# Patient Record
Sex: MALE | Race: WHITE | URBAN - METROPOLITAN AREA
[De-identification: names, ages, dates, MRNs, and addresses within clinical notes are randomized per-mention and may not be internally consistent; named-entity substitution may affect disease eponyms.]

---

## 2021-09-16 ENCOUNTER — WEB ENCOUNTER (OUTPATIENT)
Dept: URBAN - METROPOLITAN AREA CLINIC 105 | Facility: CLINIC | Age: 31
End: 2021-09-16

## 2021-09-16 ENCOUNTER — OFFICE VISIT (OUTPATIENT)
Dept: URBAN - METROPOLITAN AREA SURGERY CENTER 16 | Facility: SURGERY CENTER | Age: 31
End: 2021-09-16

## 2021-09-16 ENCOUNTER — OFFICE VISIT (OUTPATIENT)
Dept: URBAN - METROPOLITAN AREA CLINIC 105 | Facility: CLINIC | Age: 31
End: 2021-09-16
Payer: COMMERCIAL

## 2021-09-16 VITALS
DIASTOLIC BLOOD PRESSURE: 71 MMHG | TEMPERATURE: 97.3 F | HEIGHT: 73 IN | SYSTOLIC BLOOD PRESSURE: 102 MMHG | BODY MASS INDEX: 23.67 KG/M2 | HEART RATE: 134 BPM | WEIGHT: 178.6 LBS

## 2021-09-16 DIAGNOSIS — E73.9 LACTOSE INTOLERANCE: ICD-10-CM

## 2021-09-16 DIAGNOSIS — R19.7 DIARRHEA, UNSPECIFIED: ICD-10-CM

## 2021-09-16 DIAGNOSIS — R14.0 BLOATING: ICD-10-CM

## 2021-09-16 PROCEDURE — 99214 OFFICE O/P EST MOD 30 MIN: CPT | Performed by: INTERNAL MEDICINE

## 2021-09-16 RX ORDER — ADALIMUMAB 40MG/0.8ML
INJECT 0.8 MILLILITER (40 MG) BY SUBCUTANEOUS ROUTE EVERY 2 WEEKS IN THE ABDOMEN OR THIGH (ROTATE SITES) KIT SUBCUTANEOUS
Qty: 1 | Refills: 0 | Status: DISCONTINUED | COMMUNITY
Start: 1900-01-01

## 2021-09-16 RX ORDER — LORATADINE 10 MG/1
1 TABLET TABLET ORAL ONCE A DAY
Status: ACTIVE | COMMUNITY

## 2021-09-16 RX ORDER — EMTRICITABINE AND TENOFOVIR DISOPROXIL FUMARATE 200; 300 MG/1; MG/1
1 TABLET TABLET, FILM COATED ORAL ONCE A DAY
Status: ACTIVE | COMMUNITY

## 2021-09-16 RX ORDER — ESCITALOPRAM OXALATE 10 MG/1
1 TABLET TABLET, FILM COATED ORAL ONCE A DAY
Status: ACTIVE | COMMUNITY

## 2021-09-16 RX ORDER — FINASTERIDE 5 MG/1
1 TABLET TABLET, FILM COATED ORAL ONCE A DAY
Status: ACTIVE | COMMUNITY

## 2021-09-16 NOTE — HPI-TODAY'S VISIT:
The patient presents on follow-up for diarrhea.  On 3/5/20, the patient reported an onset of constant watery diarrhea 3 weeks ago. He had 4-5 BMs/day with rare nocturnal urgency. He denied traveling before onset and last took abx in October for a tonsillectomy. Stool sample and labs were done with his PCP. They were negative. He was prescribed Cipro and Flagyl he stated. He took it for a week with no benefit. He was then prescribed a 2-week course of Xifaxin TID and referred to me. He had one week left. In the last 3-4 days, he had noticed an improvement in formation and frequency (2-3x/day). He stated he had a sensitive stomach his whole life. Eating something too sweet or oily would cause bloating, cramping, and diarrhea to a lesser frequency. He would note diarrhea 1x/few months. He had a colonoscopy in 2010 for diarrhea. It was negative. He stated he was lactose intolerant. He described both indigestion and abdominal pain as bloating/cramping.  He took Descovy for HIV prevention. He used Humira for psoriasis. Previously, he took it every 2 weeks, but has decreased to 1x/month due to recurrent strep throat. Last use was in Jan 14. He took Viibryd for depression. It was well-controlled. He had a mild allergy to Aleve, he stated. He is a former tobacco user.   Today, he notes he did not follow through with the colonoscopy in March of 2020 because of COVID.  he saw Dr. Schumacher in follow-up who urged him to return for his colonoscopy given his intermittent diarrhea.  He has up to 6-7 stools/day intermittently.  He is currently off Humira.    Labs 3/11/20 - Stool study negative, Stool O&P negative x3. 2/19/20 - CBC, CMP all normal. 12/17/19 - CMO normal except sodium 147. GC/chlamydia negative.

## 2021-09-17 ENCOUNTER — OFFICE VISIT (OUTPATIENT)
Dept: URBAN - METROPOLITAN AREA SURGERY CENTER 16 | Facility: SURGERY CENTER | Age: 31
End: 2021-09-17
Payer: COMMERCIAL

## 2021-09-17 DIAGNOSIS — R19.7 ACUTE DIARRHEA: ICD-10-CM

## 2021-09-17 PROCEDURE — 45380 COLONOSCOPY AND BIOPSY: CPT | Performed by: INTERNAL MEDICINE

## 2021-09-17 PROCEDURE — G8907 PT DOC NO EVENTS ON DISCHARG: HCPCS | Performed by: INTERNAL MEDICINE

## 2021-09-17 RX ORDER — ESCITALOPRAM OXALATE 10 MG/1
1 TABLET TABLET, FILM COATED ORAL ONCE A DAY
Status: ACTIVE | COMMUNITY

## 2021-09-17 RX ORDER — FINASTERIDE 5 MG/1
1 TABLET TABLET, FILM COATED ORAL ONCE A DAY
Status: ACTIVE | COMMUNITY

## 2021-09-17 RX ORDER — LORATADINE 10 MG/1
1 TABLET TABLET ORAL ONCE A DAY
Status: ACTIVE | COMMUNITY

## 2021-09-17 RX ORDER — EMTRICITABINE AND TENOFOVIR DISOPROXIL FUMARATE 200; 300 MG/1; MG/1
1 TABLET TABLET, FILM COATED ORAL ONCE A DAY
Status: ACTIVE | COMMUNITY

## 2022-01-12 ENCOUNTER — OFFICE VISIT (OUTPATIENT)
Dept: URBAN - METROPOLITAN AREA CLINIC 105 | Facility: CLINIC | Age: 32
End: 2022-01-12
Payer: COMMERCIAL

## 2022-01-12 ENCOUNTER — TELEPHONE ENCOUNTER (OUTPATIENT)
Dept: URBAN - METROPOLITAN AREA CLINIC 105 | Facility: CLINIC | Age: 32
End: 2022-01-12

## 2022-01-12 VITALS
WEIGHT: 186.4 LBS | BODY MASS INDEX: 24.7 KG/M2 | HEIGHT: 73 IN | HEART RATE: 76 BPM | SYSTOLIC BLOOD PRESSURE: 125 MMHG | TEMPERATURE: 96.8 F | DIASTOLIC BLOOD PRESSURE: 80 MMHG

## 2022-01-12 DIAGNOSIS — A04.72 C. DIFFICILE DIARRHEA: ICD-10-CM

## 2022-01-12 PROCEDURE — 99213 OFFICE O/P EST LOW 20 MIN: CPT | Performed by: INTERNAL MEDICINE

## 2022-01-12 RX ORDER — ESCITALOPRAM OXALATE 10 MG/1
1 TABLET TABLET, FILM COATED ORAL ONCE A DAY
COMMUNITY

## 2022-01-12 RX ORDER — EMTRICITABINE AND TENOFOVIR DISOPROXIL FUMARATE 200; 300 MG/1; MG/1
1 TABLET TABLET, FILM COATED ORAL ONCE A DAY
COMMUNITY

## 2022-01-12 RX ORDER — LORATADINE 10 MG/1
1 TABLET TABLET ORAL ONCE A DAY
COMMUNITY

## 2022-01-12 RX ORDER — FINASTERIDE 5 MG/1
1 TABLET TABLET, FILM COATED ORAL ONCE A DAY
COMMUNITY

## 2022-01-12 NOTE — HPI-TODAY'S VISIT:
The patient was referred by Dr. Stanley for eval for c.diff .   A copy of this document is being forwarded to the referring provider.  - c.diff recurrent - finished vancomycin in the past, then c.diff recurred, finshed fidaxomycin another dose;  afterwards he had normal bowel movements, which for this patient is 3 bowel movements a day - he scheduled appoinntmant today because he had diarrhea for a day; but now back to normal, which is 3 bowel movements; soft; no watery stools - when he had c.diff in the past, it was all watery diarrhea

## 2022-01-20 ENCOUNTER — TELEPHONE ENCOUNTER (OUTPATIENT)
Dept: URBAN - METROPOLITAN AREA CLINIC 105 | Facility: CLINIC | Age: 32
End: 2022-01-20

## 2022-01-20 RX ORDER — LORATADINE 10 MG/1
1 TABLET TABLET ORAL ONCE A DAY
COMMUNITY

## 2022-01-20 RX ORDER — FINASTERIDE 5 MG/1
1 TABLET TABLET, FILM COATED ORAL ONCE A DAY
COMMUNITY

## 2022-01-20 RX ORDER — ESCITALOPRAM OXALATE 10 MG/1
1 TABLET TABLET, FILM COATED ORAL ONCE A DAY
COMMUNITY

## 2022-01-20 RX ORDER — EMTRICITABINE AND TENOFOVIR DISOPROXIL FUMARATE 200; 300 MG/1; MG/1
1 TABLET TABLET, FILM COATED ORAL ONCE A DAY
COMMUNITY

## 2022-01-25 ENCOUNTER — OFFICE VISIT (OUTPATIENT)
Dept: URBAN - METROPOLITAN AREA CLINIC 105 | Facility: CLINIC | Age: 32
End: 2022-01-25
Payer: COMMERCIAL

## 2022-01-25 VITALS
HEART RATE: 73 BPM | DIASTOLIC BLOOD PRESSURE: 80 MMHG | HEIGHT: 73 IN | TEMPERATURE: 97 F | SYSTOLIC BLOOD PRESSURE: 148 MMHG | BODY MASS INDEX: 24.52 KG/M2 | WEIGHT: 185 LBS

## 2022-01-25 DIAGNOSIS — E73.9 LACTOSE INTOLERANCE: ICD-10-CM

## 2022-01-25 DIAGNOSIS — A04.72 C. DIFFICILE DIARRHEA: ICD-10-CM

## 2022-01-25 DIAGNOSIS — R14.0 BLOATING: ICD-10-CM

## 2022-01-25 PROCEDURE — 99214 OFFICE O/P EST MOD 30 MIN: CPT | Performed by: INTERNAL MEDICINE

## 2022-01-25 RX ORDER — ESCITALOPRAM OXALATE 10 MG/1
1 TABLET TABLET, FILM COATED ORAL ONCE A DAY
COMMUNITY

## 2022-01-25 RX ORDER — EMTRICITABINE AND TENOFOVIR DISOPROXIL FUMARATE 200; 300 MG/1; MG/1
1 TABLET TABLET, FILM COATED ORAL ONCE A DAY
COMMUNITY

## 2022-01-25 RX ORDER — FINASTERIDE 5 MG/1
1 TABLET TABLET, FILM COATED ORAL ONCE A DAY
COMMUNITY

## 2022-01-25 RX ORDER — VANCOMYCIN HYDROCHLORIDE 125 MG/1
125 MG CAPSULE ORAL QID
Qty: 56 | Refills: 2 | OUTPATIENT
Start: 2022-01-25 | End: 2022-03-08

## 2022-01-25 RX ORDER — LORATADINE 10 MG/1
1 TABLET TABLET ORAL ONCE A DAY
COMMUNITY

## 2022-01-25 NOTE — HPI-TODAY'S VISIT:
The patient presents on follow-up for diarrhea.  On 3/5/20, the patient reported an onset of constant watery diarrhea 3 weeks ago. He had 4-5 BMs/day with rare nocturnal urgency. He denied traveling before onset and last took abx in October for a tonsillectomy. Stool sample and labs were done with his PCP. They were negative. He was prescribed Cipro and Flagyl he stated. He took it for a week with no benefit. He was then prescribed a 2-week course of Xifaxin TID and referred to me. He had one week left. In the last 3-4 days, he had noticed an improvement in formation and frequency (2-3x/day). He stated he had a sensitive stomach his whole life. Eating something too sweet or oily would cause bloating, cramping, and diarrhea to a lesser frequency. He would note diarrhea 1x/few months. He had a colonoscopy in 2010 for diarrhea. It was negative. He stated he was lactose intolerant. He described both indigestion and abdominal pain as bloating/cramping.  He took Descovy for HIV prevention. He used Humira for psoriasis. Previously, he took it every 2 weeks, but has decreased to 1x/month due to recurrent strep throat. Last use was in Jan 14. He took Viibryd for depression. It was well-controlled. He had a mild allergy to Aleve, he stated. He is a former tobacco user.   On 9/16/21, he noted he did not follow through with the colonoscopy in March of 2020 because of COVID. He saw Dr. Schumacher in follow-up who urged him to return for his colonoscopy given his intermittent diarrhea. He had up to 6-7 stools/day intermittently.  He was currently off Humira.  Today, presents in follow-up.  He saw Dr. Sargent on 1/12/22 noting recurrent C diff tx'ed with vancomycin initially then fidaxomycin.  He had a recurrence of watery diarrhea and has started on a second course of fidaxomycin that Dr. Sargent prescribed just prior to this appt.   He notes his diarrhea has resolved - almost immediately after starting fidaxomycin. He only notes some bloating and abdominal discomfort for the past 2 days, but no diarrhea - 3 BMs QD. He is drinking a probiotic daily.   Labs 3/11/20 - Stool study negative, Stool O&P negative x3. 2/19/20 - CBC, CMP all normal. 12/17/19 - CMO normal except sodium 147. GC/chlamydia negative.

## 2022-02-04 ENCOUNTER — OFFICE VISIT (OUTPATIENT)
Dept: URBAN - METROPOLITAN AREA CLINIC 105 | Facility: CLINIC | Age: 32
End: 2022-02-04

## 2022-02-04 RX ORDER — VANCOMYCIN HYDROCHLORIDE 125 MG/1
125 MG CAPSULE ORAL QID
Qty: 56 | Refills: 2 | COMMUNITY
Start: 2022-01-25 | End: 2022-03-08

## 2022-02-04 RX ORDER — ESCITALOPRAM OXALATE 10 MG/1
1 TABLET TABLET, FILM COATED ORAL ONCE A DAY
COMMUNITY

## 2022-02-04 RX ORDER — EMTRICITABINE AND TENOFOVIR DISOPROXIL FUMARATE 200; 300 MG/1; MG/1
1 TABLET TABLET, FILM COATED ORAL ONCE A DAY
COMMUNITY

## 2022-02-04 RX ORDER — LORATADINE 10 MG/1
1 TABLET TABLET ORAL ONCE A DAY
COMMUNITY

## 2022-02-04 RX ORDER — FINASTERIDE 5 MG/1
1 TABLET TABLET, FILM COATED ORAL ONCE A DAY
COMMUNITY

## 2022-03-02 ENCOUNTER — TELEPHONE ENCOUNTER (OUTPATIENT)
Dept: URBAN - METROPOLITAN AREA CLINIC 105 | Facility: CLINIC | Age: 32
End: 2022-03-02

## 2022-03-08 ENCOUNTER — LAB OUTSIDE AN ENCOUNTER (OUTPATIENT)
Dept: URBAN - METROPOLITAN AREA CLINIC 105 | Facility: CLINIC | Age: 32
End: 2022-03-08

## 2022-03-08 ENCOUNTER — OFFICE VISIT (OUTPATIENT)
Dept: URBAN - METROPOLITAN AREA CLINIC 105 | Facility: CLINIC | Age: 32
End: 2022-03-08
Payer: COMMERCIAL

## 2022-03-08 ENCOUNTER — WEB ENCOUNTER (OUTPATIENT)
Dept: URBAN - METROPOLITAN AREA CLINIC 105 | Facility: CLINIC | Age: 32
End: 2022-03-08

## 2022-03-08 VITALS
HEART RATE: 81 BPM | BODY MASS INDEX: 23.51 KG/M2 | WEIGHT: 177.4 LBS | TEMPERATURE: 97.1 F | DIASTOLIC BLOOD PRESSURE: 76 MMHG | SYSTOLIC BLOOD PRESSURE: 126 MMHG | HEIGHT: 73 IN

## 2022-03-08 DIAGNOSIS — R11.0 NAUSEA: ICD-10-CM

## 2022-03-08 DIAGNOSIS — R14.0 BLOATING: ICD-10-CM

## 2022-03-08 DIAGNOSIS — A04.72 C. DIFFICILE DIARRHEA: ICD-10-CM

## 2022-03-08 DIAGNOSIS — E73.9 LACTOSE INTOLERANCE: ICD-10-CM

## 2022-03-08 PROCEDURE — 99214 OFFICE O/P EST MOD 30 MIN: CPT | Performed by: INTERNAL MEDICINE

## 2022-03-08 RX ORDER — LORATADINE 10 MG/1
1 TABLET TABLET ORAL ONCE A DAY
Status: ON HOLD | COMMUNITY

## 2022-03-08 RX ORDER — EMTRICITABINE AND TENOFOVIR DISOPROXIL FUMARATE 200; 300 MG/1; MG/1
1 TABLET TABLET, FILM COATED ORAL ONCE A DAY
Status: ACTIVE | COMMUNITY

## 2022-03-08 RX ORDER — ONDANSETRON 4 MG/1
1 TABLET ON THE TONGUE AND ALLOW TO DISSOLVE TABLET, ORALLY DISINTEGRATING ORAL
Qty: 30 | Refills: 2 | OUTPATIENT
Start: 2022-03-08

## 2022-03-08 RX ORDER — ESCITALOPRAM OXALATE 10 MG/1
1 TABLET TABLET, FILM COATED ORAL ONCE A DAY
Status: ACTIVE | COMMUNITY

## 2022-03-08 RX ORDER — FINASTERIDE 5 MG/1
1 TABLET TABLET, FILM COATED ORAL ONCE A DAY
Status: ACTIVE | COMMUNITY

## 2022-03-08 RX ORDER — VANCOMYCIN HYDROCHLORIDE 125 MG/1
125 MG CAPSULE ORAL QID
Qty: 56 | Refills: 2 | Status: ACTIVE | COMMUNITY
Start: 2022-01-25 | End: 2022-03-08

## 2022-03-08 NOTE — HPI-TODAY'S VISIT:
PAST MEDICAL HISTORY:  The patient presents on follow-up for diarrhea.  On 3/5/20, the patient reported an onset of constant watery diarrhea 3 weeks ago. He had 4-5 BMs/day with rare nocturnal urgency. He denied traveling before onset and last took abx in October for a tonsillectomy. Stool sample and labs were done with his PCP. They were negative. He was prescribed Cipro and Flagyl he stated. He took it for a week with no benefit. He was then prescribed a 2-week course of Xifaxin TID and referred to me. He had one week left. In the last 3-4 days, he had noticed an improvement in formation and frequency (2-3x/day). He stated he had a sensitive stomach his whole life. Eating something too sweet or oily would cause bloating, cramping, and diarrhea to a lesser frequency. He would note diarrhea 1x/few months. He had a colonoscopy in 2010 for diarrhea. It was negative. He stated he was lactose intolerant. He described both indigestion and abdominal pain as bloating/cramping.  He took Descovy for HIV prevention. He used Humira for psoriasis. Previously, he took it every 2 weeks, but has decreased to 1x/month due to recurrent strep throat. Last use was in Jan 14. He took Viibryd for depression. It was well-controlled. He had a mild allergy to Aleve, he stated. He is a former tobacco user.   On 9/16/21, he noted he did not follow through with the colonoscopy in March of 2020 because of COVID. He saw Dr. Schumacher in follow-up who urged him to return for his colonoscopy given his intermittent diarrhea. He had up to 6-7 stools/day intermittently.  He was currently off Humira.  On 1/25/22, he presented in follow-up.  He saw Dr. Sargent on 1/12/22 noting recurrent C diff tx'ed with vancomycin initially then fidaxomycin.  He had a recurrence of watery diarrhea and has started on a second course of fidaxomycin that Dr. Sargent prescribed just prior to this appt.   He noted his diarrhea had resolved - almost immediately after starting fidaxomycin. He only noted some bloating and abdominal discomfort for the past 2 days, but no diarrhea - 3 BMs QD. He was drinking a probiotic daily.  HPI:  Today, he says he is off Dificid - diarrhea resolved within 1-2 days of starting it. While off Dificid in early Feb, he did not have diarrhea either, but started to note a looser stool 2-2.5 weeks ago. Twelve days ago, he had 10-15 BMs in a day, so he started on the vancomycin prescription. His frequency improved - was having diarrhea 1-2x/day. He started having nausea, fatigue, and abdominal cramping last week. He continues on vancomycin 125 mg QID (started 12 days ago). Yesterday, he had 5 soft BMs QD, today had a formed BM so far (his first formed BM in awhile). He last had watery BMs on Sunday. He denies nocturnal urgency. He is taking a probiotic daily.   Labs 3/11/20 - Stool study negative, Stool O&P negative x3. 2/19/20 - CBC, CMP all normal. 12/17/19 - CMP normal except sodium 147. GC/chlamydia negative.

## 2022-03-10 LAB — GASTROINTESTINAL PATHOGEN: (no result)

## 2022-03-11 ENCOUNTER — TELEPHONE ENCOUNTER (OUTPATIENT)
Dept: URBAN - METROPOLITAN AREA CLINIC 92 | Facility: CLINIC | Age: 32
End: 2022-03-11

## 2022-03-11 RX ORDER — METRONIDAZOLE 500 MG/1
1 TABLET TABLET ORAL THREE TIMES A DAY
Qty: 30 | Refills: 0 | OUTPATIENT
Start: 2022-03-11 | End: 2022-03-21

## 2022-03-11 RX ORDER — LORATADINE 10 MG/1
1 TABLET TABLET ORAL ONCE A DAY
Status: ON HOLD | COMMUNITY

## 2022-03-11 RX ORDER — FINASTERIDE 5 MG/1
1 TABLET TABLET, FILM COATED ORAL ONCE A DAY
COMMUNITY

## 2022-03-11 RX ORDER — EMTRICITABINE AND TENOFOVIR DISOPROXIL FUMARATE 200; 300 MG/1; MG/1
1 TABLET TABLET, FILM COATED ORAL ONCE A DAY
COMMUNITY

## 2022-03-11 RX ORDER — ESCITALOPRAM OXALATE 10 MG/1
1 TABLET TABLET, FILM COATED ORAL ONCE A DAY
COMMUNITY

## 2022-03-11 RX ORDER — ONDANSETRON 4 MG/1
1 TABLET ON THE TONGUE AND ALLOW TO DISSOLVE TABLET, ORALLY DISINTEGRATING ORAL
Qty: 30 | Refills: 2 | Status: ACTIVE | COMMUNITY
Start: 2022-03-08

## 2022-03-30 ENCOUNTER — OFFICE VISIT (OUTPATIENT)
Dept: URBAN - METROPOLITAN AREA CLINIC 105 | Facility: CLINIC | Age: 32
End: 2022-03-30
Payer: COMMERCIAL

## 2022-03-30 VITALS
HEIGHT: 73 IN | HEART RATE: 79 BPM | BODY MASS INDEX: 23.75 KG/M2 | TEMPERATURE: 97.4 F | WEIGHT: 179.2 LBS | SYSTOLIC BLOOD PRESSURE: 130 MMHG | DIASTOLIC BLOOD PRESSURE: 82 MMHG

## 2022-03-30 DIAGNOSIS — A07.1 GIARDIA: ICD-10-CM

## 2022-03-30 DIAGNOSIS — E73.8 OTHER LACTOSE INTOLERANCE: ICD-10-CM

## 2022-03-30 DIAGNOSIS — A04.72 C. DIFFICILE DIARRHEA: ICD-10-CM

## 2022-03-30 PROCEDURE — 99213 OFFICE O/P EST LOW 20 MIN: CPT | Performed by: INTERNAL MEDICINE

## 2022-03-30 RX ORDER — FINASTERIDE 5 MG/1
1 TABLET TABLET, FILM COATED ORAL ONCE A DAY
Status: ACTIVE | COMMUNITY

## 2022-03-30 RX ORDER — ONDANSETRON 4 MG/1
1 TABLET ON THE TONGUE AND ALLOW TO DISSOLVE TABLET, ORALLY DISINTEGRATING ORAL
Qty: 30 | Refills: 2 | Status: ACTIVE | COMMUNITY
Start: 2022-03-08

## 2022-03-30 RX ORDER — ESCITALOPRAM OXALATE 10 MG/1
1 TABLET TABLET, FILM COATED ORAL ONCE A DAY
Status: ACTIVE | COMMUNITY

## 2022-03-30 RX ORDER — EMTRICITABINE AND TENOFOVIR DISOPROXIL FUMARATE 200; 300 MG/1; MG/1
1 TABLET TABLET, FILM COATED ORAL ONCE A DAY
Status: ACTIVE | COMMUNITY

## 2022-03-30 NOTE — HPI-TODAY'S VISIT:
The patient presents on follow-up for diarrhea.  PAST MEDICAL HISTORY: On 3/5/20, the patient reported an onset of constant watery diarrhea 3 weeks ago. He had 4-5 BMs/day with rare nocturnal urgency. He denied traveling before onset and last took abx in October for a tonsillectomy. Stool sample and labs were done with his PCP. They were negative. He was prescribed Cipro and Flagyl he stated. He took it for a week with no benefit. He was then prescribed a 2-week course of Xifaxin TID and referred to me. He had one week left. In the last 3-4 days, he had noticed an improvement in formation and frequency (2-3x/day). He stated he had a sensitive stomach his whole life. Eating something too sweet or oily would cause bloating, cramping, and diarrhea to a lesser frequency. He would note diarrhea 1x/few months. He had a colonoscopy in 2010 for diarrhea. It was negative. He stated he was lactose intolerant. He described both indigestion and abdominal pain as bloating/cramping.  He took Descovy for HIV prevention. He used Humira for psoriasis. Previously, he took it every 2 weeks, but has decreased to 1x/month due to recurrent strep throat. Last use was in Jan 14. He took Viibryd for depression. It was well-controlled. He had a mild allergy to Aleve, he stated. He is a former tobacco user.   On 9/16/21, he noted he did not follow through with the colonoscopy in March of 2020 because of COVID. He saw Dr. Schumacher in follow-up who urged him to return for his colonoscopy given his intermittent diarrhea. He had up to 6-7 stools/day intermittently.  He was currently off Humira.  On 1/25/22, he presented in follow-up.  He saw Dr. Sargent on 1/12/22 noting recurrent C diff tx'ed with vancomycin initially then fidaxomycin.  He had a recurrence of watery diarrhea and has started on a second course of fidaxomycin that Dr. Sargent prescribed just prior to this appt.   He noted his diarrhea had resolved - almost immediately after starting fidaxomycin. He only noted some bloating and abdominal discomfort for the past 2 days, but no diarrhea - 3 BMs QD. He was drinking a probiotic daily.  On 3/8/22, he said he was off Dificid - diarrhea resolved within 1-2 days of starting it. While off Dificid in early Feb, he did not have diarrhea either, but started to note a looser stool 2-2.5 weeks ago. Twelve days ago, he had 10-15 BMs in a day, so he started on the vancomycin prescription. His frequency improved - was having diarrhea 1-2x/day. He started having nausea, fatigue, and abdominal cramping last week. He continued on vancomycin 125 mg QID (started 12 days ago). Yesterday, he had 5 soft BMs QD, today had a formed BM so far (his first formed BM in awhile). He last had watery BMs on Sunday. He denied nocturnal urgency. He was taking a probiotic daily.   ADDENDUM: Stool study positive for Giardia. Negative for C diff. Tx'ed with metronidazole x 10 days and instructed to discontinue vancomycin.  HPI: Today, he says he finished treatment for Giardia - diarrhea resolved. He now has 2-3 formed BMs QD without diarrhea. Bloating and nausea has resolved.    Labs 3/8/22 - Stool study positive for Giardia. 3/11/20 - Stool study negative, Stool O&P negative x3. 2/19/20 - CBC, CMP all normal. 12/17/19 - CMP normal except sodium 147. GC/chlamydia negative.

## 2022-10-25 ENCOUNTER — OFFICE VISIT (OUTPATIENT)
Dept: URBAN - METROPOLITAN AREA CLINIC 105 | Facility: CLINIC | Age: 32
End: 2022-10-25
Payer: COMMERCIAL

## 2022-10-25 ENCOUNTER — LAB OUTSIDE AN ENCOUNTER (OUTPATIENT)
Dept: URBAN - METROPOLITAN AREA CLINIC 105 | Facility: CLINIC | Age: 32
End: 2022-10-25

## 2022-10-25 VITALS
BODY MASS INDEX: 25.21 KG/M2 | SYSTOLIC BLOOD PRESSURE: 129 MMHG | HEIGHT: 73 IN | WEIGHT: 190.2 LBS | DIASTOLIC BLOOD PRESSURE: 81 MMHG | HEART RATE: 68 BPM | TEMPERATURE: 96.5 F

## 2022-10-25 DIAGNOSIS — A07.1 GIARDIA: ICD-10-CM

## 2022-10-25 DIAGNOSIS — R10.9 ABDOMINAL DISCOMFORT: ICD-10-CM

## 2022-10-25 DIAGNOSIS — A04.72 C. DIFFICILE DIARRHEA: ICD-10-CM

## 2022-10-25 DIAGNOSIS — R19.7 DIARRHEA, UNSPECIFIED TYPE: ICD-10-CM

## 2022-10-25 DIAGNOSIS — R14.0 BLOATING: ICD-10-CM

## 2022-10-25 DIAGNOSIS — E73.9 LACTOSE INTOLERANCE: ICD-10-CM

## 2022-10-25 PROCEDURE — 99214 OFFICE O/P EST MOD 30 MIN: CPT | Performed by: INTERNAL MEDICINE

## 2022-10-25 RX ORDER — EMTRICITABINE AND TENOFOVIR DISOPROXIL FUMARATE 200; 300 MG/1; MG/1
1 TABLET TABLET, FILM COATED ORAL ONCE A DAY
Status: ACTIVE | COMMUNITY

## 2022-10-25 RX ORDER — ONDANSETRON 4 MG/1
1 TABLET ON THE TONGUE AND ALLOW TO DISSOLVE TABLET, ORALLY DISINTEGRATING ORAL
Qty: 30 | Refills: 2 | Status: ON HOLD | COMMUNITY
Start: 2022-03-08

## 2022-10-25 RX ORDER — HYOSCYAMINE SULFATE 0.12 MG/1
1 TABLET TABLET SUBLINGUAL
Qty: 30 | Refills: 2 | OUTPATIENT
Start: 2022-10-25 | End: 2023-01-22

## 2022-10-25 RX ORDER — ESCITALOPRAM OXALATE 10 MG/1
1 TABLET TABLET, FILM COATED ORAL ONCE A DAY
Status: ACTIVE | COMMUNITY

## 2022-10-25 RX ORDER — FINASTERIDE 5 MG/1
1 TABLET TABLET, FILM COATED ORAL ONCE A DAY
Status: ACTIVE | COMMUNITY

## 2022-10-25 NOTE — HPI-TODAY'S VISIT:
The patient presents on follow-up for diarrhea.  PAST MEDICAL HISTORY: On 3/5/20, the patient reported an onset of constant watery diarrhea 3 weeks ago. He had 4-5 BMs/day with rare nocturnal urgency. He denied traveling before onset and last took abx in October for a tonsillectomy. Stool sample and labs were done with his PCP. They were negative. He was prescribed Cipro and Flagyl he stated. He took it for a week with no benefit. He was then prescribed a 2-week course of Xifaxin TID and referred to me. He had one week left. In the last 3-4 days, he had noticed an improvement in formation and frequency (2-3x/day). He stated he had a sensitive stomach his whole life. Eating something too sweet or oily would cause bloating, cramping, and diarrhea to a lesser frequency. He would note diarrhea 1x/few months. He had a colonoscopy in 2010 for diarrhea. It was negative. He stated he was lactose intolerant. He described both indigestion and abdominal pain as bloating/cramping.  He took Descovy for HIV prevention. He used Humira for psoriasis. Previously, he took it every 2 weeks, but has decreased to 1x/month due to recurrent strep throat. Last use was in Jan 14. He took Viibryd for depression. It was well-controlled. He had a mild allergy to Aleve, he stated. He is a former tobacco user.   On 9/16/21, he noted he did not follow through with the colonoscopy in March of 2020 because of COVID. He saw Dr. Schumacher in follow-up who urged him to return for his colonoscopy given his intermittent diarrhea. He had up to 6-7 stools/day intermittently.  He was currently off Humira.  On 1/25/22, he presented in follow-up.  He saw Dr. Sargent on 1/12/22 noting recurrent C diff tx'ed with vancomycin initially then fidaxomycin.  He had a recurrence of watery diarrhea and has started on a second course of fidaxomycin that Dr. Sargent prescribed just prior to this appt.   He noted his diarrhea had resolved - almost immediately after starting fidaxomycin. He only noted some bloating and abdominal discomfort for the past 2 days, but no diarrhea - 3 BMs QD. He was drinking a probiotic daily.  On 3/8/22, he said he was off Dificid - diarrhea resolved within 1-2 days of starting it. While off Dificid in early Feb, he did not have diarrhea either, but started to note a looser stool 2-2.5 weeks ago. Twelve days ago, he had 10-15 BMs in a day, so he started on the vancomycin prescription. His frequency improved - was having diarrhea 1-2x/day. He started having nausea, fatigue, and abdominal cramping last week. He continued on vancomycin 125 mg QID (started 12 days ago). Yesterday, he had 5 soft BMs QD, today had a formed BM so far (his first formed BM in awhile). He last had watery BMs on Sunday. He denied nocturnal urgency. He was taking a probiotic daily.   ADDENDUM: Stool study positive for Giardia. Negative for C diff. Tx'ed with metronidazole x 10 days and instructed to discontinue vancomycin.  On 3/30/22, he said he finished treatment for Giardia - diarrhea resolved. He now had 2-3 formed BMs QD without diarrhea. Bloating and nausea had resolved.    Today, he notes a second bout of Giardia in June tx'ed with metronidazole.  Post stool study in August was negative.  He also notes prior to going to [place] he had a bout of diarrhea and was empirically given a 3 day course of antibiotic.  More recently he notes diarrhea if he has diary even if he takes Lactaid which use to work to avoid lactose intolerance.  If he avoids diary he does not have diarrhea.  But he still has some abdominal discomfort across abdomen at umbilicus and bloating.    Labs 3/8/22 - Stool study positive for Giardia. 3/11/20 - Stool study negative, Stool O&P negative x3. 2/19/20 - CBC, CMP all normal. 12/17/19 - CMP normal except sodium 147. GC/chlamydia negative.

## 2022-10-28 ENCOUNTER — TELEPHONE ENCOUNTER (OUTPATIENT)
Dept: URBAN - METROPOLITAN AREA CLINIC 92 | Facility: CLINIC | Age: 32
End: 2022-10-28

## 2022-10-28 LAB — OVA AND PARASITES, CONC AND PERM SMEAR: (no result)

## 2022-10-28 RX ORDER — METRONIDAZOLE 500 MG/1
1 TABLET TABLET ORAL THREE TIMES A DAY
Qty: 30 TABLET | Refills: 0 | OUTPATIENT
Start: 2022-10-28 | End: 2022-11-06

## 2022-10-28 RX ORDER — HYOSCYAMINE SULFATE 0.12 MG/1
1 TABLET TABLET SUBLINGUAL
Qty: 30 | Refills: 2 | Status: ACTIVE | COMMUNITY
Start: 2022-10-25 | End: 2023-01-22

## 2022-10-28 RX ORDER — ESCITALOPRAM OXALATE 10 MG/1
1 TABLET TABLET, FILM COATED ORAL ONCE A DAY
Status: ACTIVE | COMMUNITY

## 2022-10-28 RX ORDER — ONDANSETRON 4 MG/1
1 TABLET ON THE TONGUE AND ALLOW TO DISSOLVE TABLET, ORALLY DISINTEGRATING ORAL
Qty: 30 | Refills: 2 | Status: ON HOLD | COMMUNITY
Start: 2022-03-08

## 2022-10-28 RX ORDER — EMTRICITABINE AND TENOFOVIR DISOPROXIL FUMARATE 200; 300 MG/1; MG/1
1 TABLET TABLET, FILM COATED ORAL ONCE A DAY
Status: ACTIVE | COMMUNITY

## 2022-10-28 RX ORDER — FINASTERIDE 5 MG/1
1 TABLET TABLET, FILM COATED ORAL ONCE A DAY
Status: ACTIVE | COMMUNITY

## 2022-10-30 LAB — GASTROINTESTINAL PATHOGEN: (no result)

## 2022-11-17 ENCOUNTER — TELEPHONE ENCOUNTER (OUTPATIENT)
Dept: URBAN - METROPOLITAN AREA CLINIC 105 | Facility: CLINIC | Age: 32
End: 2022-11-17

## 2022-11-29 LAB — OVA AND PARASITES, CONC AND PERM SMEAR: (no result)

## 2022-12-02 ENCOUNTER — OFFICE VISIT (OUTPATIENT)
Dept: URBAN - METROPOLITAN AREA TELEHEALTH 2 | Facility: TELEHEALTH | Age: 32
End: 2022-12-02
Payer: COMMERCIAL

## 2022-12-02 VITALS — HEIGHT: 73 IN | WEIGHT: 190 LBS | BODY MASS INDEX: 25.18 KG/M2

## 2022-12-02 DIAGNOSIS — R14.0 BLOATING: ICD-10-CM

## 2022-12-02 DIAGNOSIS — A06.0: ICD-10-CM

## 2022-12-02 DIAGNOSIS — A04.72 C. DIFFICILE DIARRHEA: ICD-10-CM

## 2022-12-02 DIAGNOSIS — E73.9 LACTOSE INTOLERANCE: ICD-10-CM

## 2022-12-02 PROCEDURE — 99214 OFFICE O/P EST MOD 30 MIN: CPT | Performed by: INTERNAL MEDICINE

## 2022-12-02 RX ORDER — ONDANSETRON 4 MG/1
1 TABLET ON THE TONGUE AND ALLOW TO DISSOLVE TABLET, ORALLY DISINTEGRATING ORAL
Qty: 30 | Refills: 2 | COMMUNITY
Start: 2022-03-08

## 2022-12-02 RX ORDER — AMITRIPTYLINE HYDROCHLORIDE 10 MG/1
1 TABLET AT BEDTIME TABLET, FILM COATED ORAL ONCE A DAY
Qty: 30 | Refills: 1 | OUTPATIENT
Start: 2022-12-02

## 2022-12-02 RX ORDER — ESCITALOPRAM OXALATE 10 MG/1
1 TABLET TABLET, FILM COATED ORAL ONCE A DAY
Status: ACTIVE | COMMUNITY

## 2022-12-02 RX ORDER — FINASTERIDE 5 MG/1
1 TABLET TABLET, FILM COATED ORAL ONCE A DAY
Status: ACTIVE | COMMUNITY

## 2022-12-02 RX ORDER — HYOSCYAMINE SULFATE 0.12 MG/1
1 TABLET TABLET SUBLINGUAL
Qty: 30 | Refills: 2 | Status: ACTIVE | COMMUNITY
Start: 2022-10-25 | End: 2023-01-22

## 2022-12-02 RX ORDER — EMTRICITABINE AND TENOFOVIR DISOPROXIL FUMARATE 200; 300 MG/1; MG/1
1 TABLET TABLET, FILM COATED ORAL ONCE A DAY
Status: ACTIVE | COMMUNITY

## 2022-12-02 NOTE — HPI-TODAY'S VISIT:
The patient presents on follow-up for diarrhea.  PAST MEDICAL HISTORY: On 3/5/20, the patient reported an onset of constant watery diarrhea 3 weeks ago. He had 4-5 BMs/day with rare nocturnal urgency. He denied traveling before onset and last took abx in October for a tonsillectomy. Stool sample and labs were done with his PCP. They were negative. He was prescribed Cipro and Flagyl he stated. He took it for a week with no benefit. He was then prescribed a 2-week course of Xifaxin TID and referred to me. He had one week left. In the last 3-4 days, he had noticed an improvement in formation and frequency (2-3x/day). He stated he had a sensitive stomach his whole life. Eating something too sweet or oily would cause bloating, cramping, and diarrhea to a lesser frequency. He would note diarrhea 1x/few months. He had a colonoscopy in 2010 for diarrhea. It was negative. He stated he was lactose intolerant. He described both indigestion and abdominal pain as bloating/cramping.  He took Descovy for HIV prevention. He used Humira for psoriasis. Previously, he took it every 2 weeks, but has decreased to 1x/month due to recurrent strep throat. Last use was in Jan 14. He took Viibryd for depression. It was well-controlled. He had a mild allergy to Aleve, he stated. He is a former tobacco user.   On 9/16/21, he noted he did not follow through with the colonoscopy in March of 2020 because of COVID. He saw Dr. Schumacher in follow-up who urged him to return for his colonoscopy given his intermittent diarrhea. He had up to 6-7 stools/day intermittently.  He was currently off Humira.  On 1/25/22, he presented in follow-up.  He saw Dr. Sargent on 1/12/22 noting recurrent C diff tx'ed with vancomycin initially then fidaxomycin.  He had a recurrence of watery diarrhea and has started on a second course of fidaxomycin that Dr. Sargent prescribed just prior to this appt.   He noted his diarrhea had resolved - almost immediately after starting fidaxomycin. He only noted some bloating and abdominal discomfort for the past 2 days, but no diarrhea - 3 BMs QD. He was drinking a probiotic daily.  On 3/8/22, he said he was off Dificid - diarrhea resolved within 1-2 days of starting it. While off Dificid in early Feb, he did not have diarrhea either, but started to note a looser stool 2-2.5 weeks ago. Twelve days ago, he had 10-15 BMs in a day, so he started on the vancomycin prescription. His frequency improved - was having diarrhea 1-2x/day. He started having nausea, fatigue, and abdominal cramping last week. He continued on vancomycin 125 mg QID (started 12 days ago). Yesterday, he had 5 soft BMs QD, today had a formed BM so far (his first formed BM in awhile). He last had watery BMs on Sunday. He denied nocturnal urgency. He was taking a probiotic daily.   ADDENDUM: Stool study positive for Giardia. Negative for C diff. Tx'ed with metronidazole x 10 days and instructed to discontinue vancomycin.  On 3/30/22, he said he finished treatment for Giardia - diarrhea resolved. He now had 2-3 formed BMs QD without diarrhea. Bloating and nausea had resolved.    Today, he notes a second bout of Giardia in June tx'ed with metronidazole.  Post stool study in August was negative.  He also notes prior to going to [place] he had a bout of diarrhea and was empirically given a 3 day course of antibiotic.  More recently he notes diarrhea if he has diary even if he takes Lactaid which use to work to avoid lactose intolerance.  If he avoids diary he does not have diarrhea.  But he still has some abdominal discomfort across abdomen at umbilicus and bloating.  HPI: Today, he says he finished abx treatment for endolimax alejandra - finished on Nov 8. He says that his symptoms resolved 2-3 days after starting treatment; however, his symptoms returned within 7 days of finishing abx. He is having abdominal discomfort, gas, and diarrhea. On some days, he has 2-3 BMs QD, but a lot of times can have 8-10 BMs QD. On the days he has 2-3 BMs, he has diarrhea while the he has semi-formed BMs on days he has 8-10 BMs. Hyoscyamine helps with abdominal pain. He cannot pinpoint a dietary trigger for his symptoms. He has been taking a probiotic.    Labs 11/22/22 - Stool O&P negative. 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 Stool study negative. 3/8/22 - Stool study positive for Giardia. 3/11/20 - Stool study negative, Stool O&P negative x3. 2/19/20 - CBC, CMP all normal. 12/17/19 - CMP normal except sodium 147. GC/chlamydia negative.

## 2022-12-08 ENCOUNTER — LAB OUTSIDE AN ENCOUNTER (OUTPATIENT)
Dept: URBAN - METROPOLITAN AREA CLINIC 105 | Facility: CLINIC | Age: 32
End: 2022-12-08

## 2022-12-14 LAB — GASTROINTESTINAL PATHOGEN: (no result)

## 2023-01-06 ENCOUNTER — OFFICE VISIT (OUTPATIENT)
Dept: URBAN - METROPOLITAN AREA TELEHEALTH 2 | Facility: TELEHEALTH | Age: 33
End: 2023-01-06

## 2023-01-06 VITALS — HEIGHT: 73 IN | WEIGHT: 180 LBS | BODY MASS INDEX: 23.86 KG/M2

## 2023-01-06 RX ORDER — AMITRIPTYLINE HYDROCHLORIDE 10 MG/1
1 TABLET AT BEDTIME TABLET, FILM COATED ORAL ONCE A DAY
Qty: 30 | Refills: 1 | Status: ACTIVE | COMMUNITY
Start: 2022-12-02

## 2023-01-06 RX ORDER — ESCITALOPRAM OXALATE 10 MG/1
1 TABLET TABLET, FILM COATED ORAL ONCE A DAY
Status: ACTIVE | COMMUNITY

## 2023-01-06 RX ORDER — FINASTERIDE 5 MG/1
1 TABLET TABLET, FILM COATED ORAL ONCE A DAY
Status: ACTIVE | COMMUNITY

## 2023-01-06 RX ORDER — EMTRICITABINE AND TENOFOVIR DISOPROXIL FUMARATE 200; 300 MG/1; MG/1
1 TABLET TABLET, FILM COATED ORAL ONCE A DAY
Status: ACTIVE | COMMUNITY

## 2023-01-06 RX ORDER — ONDANSETRON 4 MG/1
1 TABLET ON THE TONGUE AND ALLOW TO DISSOLVE TABLET, ORALLY DISINTEGRATING ORAL
Qty: 30 | Refills: 2 | COMMUNITY
Start: 2022-03-08

## 2023-01-06 RX ORDER — HYOSCYAMINE SULFATE 0.12 MG/1
1 TABLET TABLET SUBLINGUAL
Qty: 30 | Refills: 2 | Status: ACTIVE | COMMUNITY
Start: 2022-10-25 | End: 2023-01-22

## 2023-01-06 NOTE — HPI-TODAY'S VISIT:
The patient presents on follow-up for diarrhea.  PAST MEDICAL HISTORY: On 3/5/20, the patient reported an onset of constant watery diarrhea 3 weeks ago. He had 4-5 BMs/day with rare nocturnal urgency. He denied traveling before onset and last took abx in October for a tonsillectomy. Stool sample and labs were done with his PCP. They were negative. He was prescribed Cipro and Flagyl he stated. He took it for a week with no benefit. He was then prescribed a 2-week course of Xifaxin TID and referred to me. He had one week left. In the last 3-4 days, he had noticed an improvement in formation and frequency (2-3x/day). He stated he had a sensitive stomach his whole life. Eating something too sweet or oily would cause bloating, cramping, and diarrhea to a lesser frequency. He would note diarrhea 1x/few months. He had a colonoscopy in 2010 for diarrhea. It was negative. He stated he was lactose intolerant. He described both indigestion and abdominal pain as bloating/cramping.  He took Descovy for HIV prevention. He used Humira for psoriasis. Previously, he took it every 2 weeks, but has decreased to 1x/month due to recurrent strep throat. Last use was in Jan 14. He took Viibryd for depression. It was well-controlled. He had a mild allergy to Aleve, he stated. He is a former tobacco user.   On 9/16/21, he noted he did not follow through with the colonoscopy in March of 2020 because of COVID. He saw Dr. Schumacher in follow-up who urged him to return for his colonoscopy given his intermittent diarrhea. He had up to 6-7 stools/day intermittently.  He was currently off Humira.  On 1/25/22, he presented in follow-up.  He saw Dr. Sargent on 1/12/22 noting recurrent C diff tx'ed with vancomycin initially then fidaxomycin.  He had a recurrence of watery diarrhea and has started on a second course of fidaxomycin that Dr. Sargent prescribed just prior to this appt.   He noted his diarrhea had resolved - almost immediately after starting fidaxomycin. He only noted some bloating and abdominal discomfort for the past 2 days, but no diarrhea - 3 BMs QD. He was drinking a probiotic daily.  On 3/8/22, he said he was off Dificid - diarrhea resolved within 1-2 days of starting it. While off Dificid in early Feb, he did not have diarrhea either, but started to note a looser stool 2-2.5 weeks ago. Twelve days ago, he had 10-15 BMs in a day, so he started on the vancomycin prescription. His frequency improved - was having diarrhea 1-2x/day. He started having nausea, fatigue, and abdominal cramping last week. He continued on vancomycin 125 mg QID (started 12 days ago). Yesterday, he had 5 soft BMs QD, today had a formed BM so far (his first formed BM in awhile). He last had watery BMs on Sunday. He denied nocturnal urgency. He was taking a probiotic daily.   ADDENDUM: Stool study positive for Giardia. Negative for C diff. Tx'ed with metronidazole x 10 days and instructed to discontinue vancomycin.  On 3/30/22, he said he finished treatment for Giardia - diarrhea resolved. He now had 2-3 formed BMs QD without diarrhea. Bloating and nausea had resolved.    Today, he notes a second bout of Giardia in June tx'ed with metronidazole.  Post stool study in August was negative.  He also notes prior to going to [place] he had a bout of diarrhea and was empirically given a 3 day course of antibiotic.  More recently he notes diarrhea if he has diary even if he takes Lactaid which use to work to avoid lactose intolerance.  If he avoids diary he does not have diarrhea.  But he still has some abdominal discomfort across abdomen at umbilicus and bloating.  HPI: Today, he says he finished abx treatment for endolimax alejandra - finished on Nov 8. He says that his symptoms resolved 2-3 days after starting treatment; however, his symptoms returned within 7 days of finishing abx. He is having abdominal discomfort, gas, and diarrhea. On some days, he has 2-3 BMs QD, but a lot of times can have 8-10 BMs QD. On the days he has 2-3 BMs, he has diarrhea while the he has semi-formed BMs on days he has 8-10 BMs. Hyoscyamine helps with abdominal pain. He cannot pinpoint a dietary trigger for his symptoms. He has been taking a probiotic.    Labs 12/8/22 - Stool study negative. 11/22/22 - Stool O&P negative. 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 Stool study negative. 3/8/22 - Stool study positive for Giardia. 3/11/20 - Stool study negative, Stool O&P negative x3. 2/19/20 - CBC, CMP all normal. 12/17/19 - CMP normal except sodium 147. GC/chlamydia negative.

## 2023-01-13 ENCOUNTER — OFFICE VISIT (OUTPATIENT)
Dept: URBAN - METROPOLITAN AREA TELEHEALTH 2 | Facility: TELEHEALTH | Age: 33
End: 2023-01-13
Payer: COMMERCIAL

## 2023-01-13 VITALS — WEIGHT: 190 LBS | BODY MASS INDEX: 25.18 KG/M2 | HEIGHT: 73 IN

## 2023-01-13 DIAGNOSIS — A07.1 GIARDIA: ICD-10-CM

## 2023-01-13 DIAGNOSIS — R14.0 BLOATING: ICD-10-CM

## 2023-01-13 DIAGNOSIS — A04.72 C. DIFFICILE DIARRHEA: ICD-10-CM

## 2023-01-13 DIAGNOSIS — A06.0: ICD-10-CM

## 2023-01-13 PROCEDURE — 99213 OFFICE O/P EST LOW 20 MIN: CPT | Performed by: INTERNAL MEDICINE

## 2023-01-13 RX ORDER — AMITRIPTYLINE HYDROCHLORIDE 10 MG/1
1 TABLET AT BEDTIME TABLET, FILM COATED ORAL ONCE A DAY
Qty: 30 | Refills: 1 | Status: ACTIVE | COMMUNITY
Start: 2022-12-02

## 2023-01-13 RX ORDER — EMTRICITABINE AND TENOFOVIR DISOPROXIL FUMARATE 200; 300 MG/1; MG/1
1 TABLET TABLET, FILM COATED ORAL ONCE A DAY
Status: ACTIVE | COMMUNITY

## 2023-01-13 RX ORDER — ONDANSETRON 4 MG/1
1 TABLET ON THE TONGUE AND ALLOW TO DISSOLVE TABLET, ORALLY DISINTEGRATING ORAL
Qty: 30 | Refills: 2 | Status: ACTIVE | COMMUNITY
Start: 2022-03-08

## 2023-01-13 RX ORDER — FINASTERIDE 5 MG/1
1 TABLET TABLET, FILM COATED ORAL ONCE A DAY
Status: ACTIVE | COMMUNITY

## 2023-01-13 RX ORDER — HYOSCYAMINE SULFATE 0.12 MG/1
1 TABLET TABLET SUBLINGUAL
Qty: 30 | Refills: 2 | Status: ACTIVE | COMMUNITY
Start: 2022-10-25 | End: 2023-01-22

## 2023-01-13 RX ORDER — AMITRIPTYLINE HYDROCHLORIDE 10 MG/1
1 TABLET TABLET, FILM COATED ORAL
Qty: 90 | Refills: 3 | OUTPATIENT
Start: 2022-12-02

## 2023-01-13 RX ORDER — ESCITALOPRAM OXALATE 10 MG/1
1 TABLET TABLET, FILM COATED ORAL ONCE A DAY
Status: ACTIVE | COMMUNITY

## 2023-01-13 NOTE — HPI-TODAY'S VISIT:
The patient presents on follow-up for diarrhea.  PAST MEDICAL HISTORY: On 3/5/20, the patient reported an onset of constant watery diarrhea 3 weeks ago. He had 4-5 BMs/day with rare nocturnal urgency. He denied traveling before onset and last took abx in October for a tonsillectomy. Stool sample and labs were done with his PCP. They were negative. He was prescribed Cipro and Flagyl he stated. He took it for a week with no benefit. He was then prescribed a 2-week course of Xifaxin TID and referred to me. He had one week left. In the last 3-4 days, he had noticed an improvement in formation and frequency (2-3x/day). He stated he had a sensitive stomach his whole life. Eating something too sweet or oily would cause bloating, cramping, and diarrhea to a lesser frequency. He would note diarrhea 1x/few months. He had a colonoscopy in 2010 for diarrhea. It was negative. He stated he was lactose intolerant. He described both indigestion and abdominal pain as bloating/cramping.  He took Descovy for HIV prevention. He used Humira for psoriasis. Previously, he took it every 2 weeks, but has decreased to 1x/month due to recurrent strep throat. Last use was in Jan 14. He took Viibryd for depression. It was well-controlled. He had a mild allergy to Aleve, he stated. He is a former tobacco user.   On 9/16/21, he noted he did not follow through with the colonoscopy in March of 2020 because of COVID. He saw Dr. Schumacher in follow-up who urged him to return for his colonoscopy given his intermittent diarrhea. He had up to 6-7 stools/day intermittently.  He was currently off Humira.  On 1/25/22, he presented in follow-up.  He saw Dr. Sargent on 1/12/22 noting recurrent C diff tx'ed with vancomycin initially then fidaxomycin.  He had a recurrence of watery diarrhea and has started on a second course of fidaxomycin that Dr. Sargent prescribed just prior to this appt.   He noted his diarrhea had resolved - almost immediately after starting fidaxomycin. He only noted some bloating and abdominal discomfort for the past 2 days, but no diarrhea - 3 BMs QD. He was drinking a probiotic daily.  On 3/8/22, he said he was off Dificid - diarrhea resolved within 1-2 days of starting it. While off Dificid in early Feb, he did not have diarrhea either, but started to note a looser stool 2-2.5 weeks ago. Twelve days ago, he had 10-15 BMs in a day, so he started on the vancomycin prescription. His frequency improved - was having diarrhea 1-2x/day. He started having nausea, fatigue, and abdominal cramping last week. He continued on vancomycin 125 mg QID (started 12 days ago). Yesterday, he had 5 soft BMs QD, today had a formed BM so far (his first formed BM in awhile). He last had watery BMs on Sunday. He denied nocturnal urgency. He was taking a probiotic daily.   ADDENDUM: Stool study positive for Giardia. Negative for C diff. Tx'ed with metronidazole x 10 days and instructed to discontinue vancomycin.  On 3/30/22, he said he finished treatment for Giardia - diarrhea resolved. He now had 2-3 formed BMs QD without diarrhea. Bloating and nausea had resolved.    Today, he notes a second bout of Giardia in June tx'ed with metronidazole.  Post stool study in August was negative.  He also notes prior to going to [place] he had a bout of diarrhea and was empirically given a 3 day course of antibiotic.  More recently he notes diarrhea if he has diary even if he takes Lactaid which use to work to avoid lactose intolerance.  If he avoids diary he does not have diarrhea.  But he still has some abdominal discomfort across abdomen at umbilicus and bloating.  On 12/2/22, he said he finished abx treatment for endolimax alejandra - finished on Nov 8. He said that his symptoms resolved 2-3 days after starting treatment; however, his symptoms returned within 7 days of finishing abx. He was having abdominal discomfort, gas, and diarrhea. On some days, he had 2-3 BMs QD, but a lot of times could have 8-10 BMs QD. On the days he had 2-3 BMs, he had diarrhea while the he had semi-formed BMs on days he had 8-10 BMs. Hyoscyamine helped with abdominal pain. He could not pinpoint a dietary trigger for his symptoms. He had been taking a probiotic.  HPI: Today, he says he has started on amitriptyline which has provided a significant benefit. He had some grogginess on the med, but taking it earlier helps. On the medication, he has 1-2 formed BMs QD and had less gas. Since running out of amitriptyline, he has had more abdominal discomfort, gas, and diarrhea. He has trialed a few probiotics, but felt bloated on them and noted less of a need for them due to amitriptyline working for him. He has taken hyoscyamine a few times which has helped.    Labs 12/8/22 - Stool study negative. 11/22/22 - Stool O&P negative. 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 Stool study negative. 3/8/22 - Stool study positive for Giardia. 3/11/20 - Stool study negative, Stool O&P negative x3. 2/19/20 - CBC, CMP all normal. 12/17/19 - CMP normal except sodium 147. GC/chlamydia negative.

## 2023-01-24 ENCOUNTER — WEB ENCOUNTER (OUTPATIENT)
Dept: URBAN - METROPOLITAN AREA CLINIC 105 | Facility: CLINIC | Age: 33
End: 2023-01-24

## 2023-01-25 ENCOUNTER — LAB OUTSIDE AN ENCOUNTER (OUTPATIENT)
Dept: URBAN - METROPOLITAN AREA CLINIC 105 | Facility: CLINIC | Age: 33
End: 2023-01-25

## 2023-01-27 LAB — GASTROINTESTINAL PATHOGEN: (no result)

## 2023-02-03 ENCOUNTER — OFFICE VISIT (OUTPATIENT)
Dept: URBAN - METROPOLITAN AREA TELEHEALTH 2 | Facility: TELEHEALTH | Age: 33
End: 2023-02-03
Payer: COMMERCIAL

## 2023-02-03 VITALS — HEIGHT: 73 IN

## 2023-02-03 DIAGNOSIS — R10.84 ABDOMINAL CRAMPING, GENERALIZED: ICD-10-CM

## 2023-02-03 DIAGNOSIS — R10.9 ABDOMINAL DISCOMFORT: ICD-10-CM

## 2023-02-03 DIAGNOSIS — R19.7 DIARRHEA, UNSPECIFIED TYPE: ICD-10-CM

## 2023-02-03 DIAGNOSIS — A06.0: ICD-10-CM

## 2023-02-03 DIAGNOSIS — A04.72 C. DIFFICILE DIARRHEA: ICD-10-CM

## 2023-02-03 DIAGNOSIS — A07.1 GIARDIA: ICD-10-CM

## 2023-02-03 DIAGNOSIS — E73.8 OTHER LACTOSE INTOLERANCE: ICD-10-CM

## 2023-02-03 PROCEDURE — 99213 OFFICE O/P EST LOW 20 MIN: CPT | Performed by: INTERNAL MEDICINE

## 2023-02-03 RX ORDER — AMITRIPTYLINE HYDROCHLORIDE 10 MG/1
2 TABLETS TABLET, FILM COATED ORAL
Qty: 180 | Refills: 3 | OUTPATIENT
Start: 2022-12-02

## 2023-02-03 RX ORDER — FINASTERIDE 5 MG/1
1 TABLET TABLET, FILM COATED ORAL ONCE A DAY
COMMUNITY

## 2023-02-03 RX ORDER — ONDANSETRON 4 MG/1
1 TABLET ON THE TONGUE AND ALLOW TO DISSOLVE TABLET, ORALLY DISINTEGRATING ORAL
Qty: 30 | Refills: 2 | COMMUNITY
Start: 2022-03-08

## 2023-02-03 RX ORDER — ESCITALOPRAM OXALATE 10 MG/1
1 TABLET TABLET, FILM COATED ORAL ONCE A DAY
COMMUNITY

## 2023-02-03 RX ORDER — EMTRICITABINE AND TENOFOVIR DISOPROXIL FUMARATE 200; 300 MG/1; MG/1
1 TABLET TABLET, FILM COATED ORAL ONCE A DAY
COMMUNITY

## 2023-02-03 RX ORDER — AMITRIPTYLINE HYDROCHLORIDE 10 MG/1
1 TABLET TABLET, FILM COATED ORAL
Qty: 90 | Refills: 3 | COMMUNITY
Start: 2022-12-02

## 2023-02-03 NOTE — HPI-TODAY'S VISIT:
The patient presents on follow-up for diarrhea.  PAST MEDICAL HISTORY: On 3/5/20, the patient reported an onset of constant watery diarrhea 3 weeks ago. He had 4-5 BMs/day with rare nocturnal urgency. He denied traveling before onset and last took abx in October for a tonsillectomy. Stool sample and labs were done with his PCP. They were negative. He was prescribed Cipro and Flagyl he stated. He took it for a week with no benefit. He was then prescribed a 2-week course of Xifaxin TID and referred to me. He had one week left. In the last 3-4 days, he had noticed an improvement in formation and frequency (2-3x/day). He stated he had a sensitive stomach his whole life. Eating something too sweet or oily would cause bloating, cramping, and diarrhea to a lesser frequency. He would note diarrhea 1x/few months. He had a colonoscopy in 2010 for diarrhea. It was negative. He stated he was lactose intolerant. He described both indigestion and abdominal pain as bloating/cramping.  He took Descovy for HIV prevention. He used Humira for psoriasis. Previously, he took it every 2 weeks, but has decreased to 1x/month due to recurrent strep throat. Last use was in Jan 14. He took Viibryd for depression. It was well-controlled. He had a mild allergy to Aleve, he stated. He is a former tobacco user.   On 9/16/21, he noted he did not follow through with the colonoscopy in March of 2020 because of COVID. He saw Dr. Schumacher in follow-up who urged him to return for his colonoscopy given his intermittent diarrhea. He had up to 6-7 stools/day intermittently.  He was currently off Humira.  On 1/25/22, he presented in follow-up.  He saw Dr. Sargent on 1/12/22 noting recurrent C diff tx'ed with vancomycin initially then fidaxomycin.  He had a recurrence of watery diarrhea and has started on a second course of fidaxomycin that Dr. Sargent prescribed just prior to this appt.   He noted his diarrhea had resolved - almost immediately after starting fidaxomycin. He only noted some bloating and abdominal discomfort for the past 2 days, but no diarrhea - 3 BMs QD. He was drinking a probiotic daily.  On 3/8/22, he said he was off Dificid - diarrhea resolved within 1-2 days of starting it. While off Dificid in early Feb, he did not have diarrhea either, but started to note a looser stool 2-2.5 weeks ago. Twelve days ago, he had 10-15 BMs in a day, so he started on the vancomycin prescription. His frequency improved - was having diarrhea 1-2x/day. He started having nausea, fatigue, and abdominal cramping last week. He continued on vancomycin 125 mg QID (started 12 days ago). Yesterday, he had 5 soft BMs QD, today had a formed BM so far (his first formed BM in awhile). He last had watery BMs on Sunday. He denied nocturnal urgency. He was taking a probiotic daily.   ADDENDUM: Stool study positive for Giardia. Negative for C diff. Tx'ed with metronidazole x 10 days and instructed to discontinue vancomycin.  On 3/30/22, he said he finished treatment for Giardia - diarrhea resolved. He now had 2-3 formed BMs QD without diarrhea. Bloating and nausea had resolved.    Today, he notes a second bout of Giardia in June tx'ed with metronidazole.  Post stool study in August was negative.  He also notes prior to going to [place] he had a bout of diarrhea and was empirically given a 3 day course of antibiotic.  More recently he notes diarrhea if he has diary even if he takes Lactaid which use to work to avoid lactose intolerance.  If he avoids diary he does not have diarrhea.  But he still has some abdominal discomfort across abdomen at umbilicus and bloating.  On 12/2/22, he said he finished abx treatment for endolimax alejandra - finished on Nov 8. He said that his symptoms resolved 2-3 days after starting treatment; however, his symptoms returned within 7 days of finishing abx. He was having abdominal discomfort, gas, and diarrhea. On some days, he had 2-3 BMs QD, but a lot of times could have 8-10 BMs QD. On the days he had 2-3 BMs, he had diarrhea while the he had semi-formed BMs on days he had 8-10 BMs. Hyoscyamine helped with abdominal pain. He could not pinpoint a dietary trigger for his symptoms. He had been taking a probiotic.  On 1/13/23, he said he had started on amitriptyline which had provided a significant benefit. He had some grogginess on the med, but taking it earlier helped. On the medication, he had 1-2 formed BMs QD and had less gas. Since running out of amitriptyline, he had more abdominal discomfort, gas, and diarrhea. He had trialed a few probiotics, but felt bloated on them and noted less of a need for them due to amitriptyline working for him. He had taken hyoscyamine a few times which had helped.  HPI: Today, he says he had a lot of diarrhea recently (3 BMs/day were diarrhea while others were formed), so he increased amitriptyline to 10 mg 2 pills QHS. He has noticed an improvement over the last 2 days - yesterday was the first day he did not have diarrhea. He has not had any somnolence on the increased dosage of amitriptyline. He previously took Imodium. Hyoscyamine works for abdominal pain.    Labs 1/25/23 - Stool study negative. 12/8/22 - Stool study negative. 11/22/22 - Stool O&P negative. 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 Stool study negative. 3/8/22 - Stool study positive for Giardia. 3/11/20 - Stool study negative, Stool O&P negative x3. 2/19/20 - CBC, CMP all normal. 12/17/19 - CMP normal except sodium 147. GC/chlamydia negative.

## 2023-05-18 ENCOUNTER — WEB ENCOUNTER (OUTPATIENT)
Dept: URBAN - METROPOLITAN AREA CLINIC 105 | Facility: CLINIC | Age: 33
End: 2023-05-18

## 2023-05-23 ENCOUNTER — OFFICE VISIT (OUTPATIENT)
Dept: URBAN - METROPOLITAN AREA CLINIC 105 | Facility: CLINIC | Age: 33
End: 2023-05-23
Payer: COMMERCIAL

## 2023-05-23 VITALS
HEIGHT: 73 IN | DIASTOLIC BLOOD PRESSURE: 88 MMHG | BODY MASS INDEX: 24.33 KG/M2 | TEMPERATURE: 97.8 F | WEIGHT: 183.6 LBS | SYSTOLIC BLOOD PRESSURE: 130 MMHG | HEART RATE: 89 BPM

## 2023-05-23 DIAGNOSIS — R14.0 BLOATING: ICD-10-CM

## 2023-05-23 DIAGNOSIS — R10.9 ABDOMINAL DISCOMFORT: ICD-10-CM

## 2023-05-23 DIAGNOSIS — A06.0: ICD-10-CM

## 2023-05-23 DIAGNOSIS — E73.9 LACTOSE INTOLERANCE: ICD-10-CM

## 2023-05-23 DIAGNOSIS — A04.72 C. DIFFICILE DIARRHEA: ICD-10-CM

## 2023-05-23 DIAGNOSIS — A07.1 GIARDIA: ICD-10-CM

## 2023-05-23 DIAGNOSIS — R19.7 DIARRHEA, UNSPECIFIED TYPE: ICD-10-CM

## 2023-05-23 PROBLEM — 782415009: Status: ACTIVE | Noted: 2021-09-16

## 2023-05-23 PROCEDURE — 99214 OFFICE O/P EST MOD 30 MIN: CPT | Performed by: INTERNAL MEDICINE

## 2023-05-23 RX ORDER — ONDANSETRON 4 MG/1
1 TABLET ON THE TONGUE AND ALLOW TO DISSOLVE TABLET, ORALLY DISINTEGRATING ORAL
Qty: 30 | Refills: 2 | Status: ACTIVE | COMMUNITY
Start: 2022-03-08

## 2023-05-23 RX ORDER — AMITRIPTYLINE HYDROCHLORIDE 10 MG/1
2 TABLETS TABLET, FILM COATED ORAL
Qty: 180 | Refills: 3 | Status: ACTIVE | COMMUNITY
Start: 2022-12-02

## 2023-05-23 RX ORDER — FINASTERIDE 5 MG/1
1 TABLET TABLET, FILM COATED ORAL ONCE A DAY
Status: ACTIVE | COMMUNITY

## 2023-05-23 RX ORDER — EMTRICITABINE AND TENOFOVIR DISOPROXIL FUMARATE 200; 300 MG/1; MG/1
1 TABLET TABLET, FILM COATED ORAL ONCE A DAY
Status: ACTIVE | COMMUNITY

## 2023-05-23 RX ORDER — ESCITALOPRAM OXALATE 10 MG/1
1 TABLET TABLET, FILM COATED ORAL ONCE A DAY
Status: ACTIVE | COMMUNITY

## 2023-05-23 NOTE — HPI-TODAY'S VISIT:
The patient presents on follow-up for diarrhea.  PAST MEDICAL HISTORY: On 3/5/20, the patient reported an onset of constant watery diarrhea 3 weeks ago. He had 4-5 BMs/day with rare nocturnal urgency. He denied traveling before onset and last took abx in October for a tonsillectomy. Stool sample and labs were done with his PCP. They were negative. He was prescribed Cipro and Flagyl he stated. He took it for a week with no benefit. He was then prescribed a 2-week course of Xifaxin TID and referred to me. He had one week left. In the last 3-4 days, he had noticed an improvement in formation and frequency (2-3x/day). He stated he had a sensitive stomach his whole life. Eating something too sweet or oily would cause bloating, cramping, and diarrhea to a lesser frequency. He would note diarrhea 1x/few months. He had a colonoscopy in 2010 for diarrhea. It was negative. He stated he was lactose intolerant. He described both indigestion and abdominal pain as bloating/cramping.  He took Descovy for HIV prevention. He used Humira for psoriasis. Previously, he took it every 2 weeks, but has decreased to 1x/month due to recurrent strep throat. Last use was in Jan 14. He took Viibryd for depression. It was well-controlled. He had a mild allergy to Aleve, he stated. He is a former tobacco user.   On 9/16/21, he noted he did not follow through with the colonoscopy in March of 2020 because of COVID. He saw Dr. Schumacher in follow-up who urged him to return for his colonoscopy given his intermittent diarrhea. He had up to 6-7 stools/day intermittently.  He was currently off Humira.  On 1/25/22, he presented in follow-up.  He saw Dr. Sargent on 1/12/22 noting recurrent C diff tx'ed with vancomycin initially then fidaxomycin.  He had a recurrence of watery diarrhea and has started on a second course of fidaxomycin that Dr. Sargent prescribed just prior to this appt.   He noted his diarrhea had resolved - almost immediately after starting fidaxomycin. He only noted some bloating and abdominal discomfort for the past 2 days, but no diarrhea - 3 BMs QD. He was drinking a probiotic daily.  On 3/8/22, he said he was off Dificid - diarrhea resolved within 1-2 days of starting it. While off Dificid in early Feb, he did not have diarrhea either, but started to note a looser stool 2-2.5 weeks ago. Twelve days ago, he had 10-15 BMs in a day, so he started on the vancomycin prescription. His frequency improved - was having diarrhea 1-2x/day. He started having nausea, fatigue, and abdominal cramping last week. He continued on vancomycin 125 mg QID (started 12 days ago). Yesterday, he had 5 soft BMs QD, today had a formed BM so far (his first formed BM in awhile). He last had watery BMs on Sunday. He denied nocturnal urgency. He was taking a probiotic daily.   ADDENDUM: Stool study positive for Giardia. Negative for C diff. Tx'ed with metronidazole x 10 days and instructed to discontinue vancomycin.  On 3/30/22, he said he finished treatment for Giardia - diarrhea resolved. He now had 2-3 formed BMs QD without diarrhea. Bloating and nausea had resolved.    On 10/25/22, he noted a second bout of Giardia in June tx'ed with metronidazole.  Post stool study in August was negative.  He also notes prior to going to [place] he had a bout of diarrhea and was empirically given a 3 day course of antibiotic.  More recently he noted diarrhea if he had diary even if he took Lactaid which used to work to avoid lactose intolerance.  If he avoided diary he did not have diarrhea.  But he still had some abdominal discomfort across abdomen at umbilicus and bloating.  On 12/2/22, he said he finished abx treatment for endolimax alejandra - finished on Nov 8. He said that his symptoms resolved 2-3 days after starting treatment; however, his symptoms returned within 7 days of finishing abx. He was having abdominal discomfort, gas, and diarrhea. On some days, he had 2-3 BMs QD, but a lot of times could have 8-10 BMs QD. On the days he had 2-3 BMs, he had diarrhea while the he had semi-formed BMs on days he had 8-10 BMs. Hyoscyamine helped with abdominal pain. He could not pinpoint a dietary trigger for his symptoms. He had been taking a probiotic.  On 1/13/23, he said he had started on amitriptyline which had provided a significant benefit. He had some grogginess on the med, but taking it earlier helped. On the medication, he had 1-2 formed BMs QD and had less gas. Since running out of amitriptyline, he had more abdominal discomfort, gas, and diarrhea. He had trialed a few probiotics, but felt bloated on them and noted less of a need for them due to amitriptyline working for him. He had taken hyoscyamine a few times which had helped.  On 2/3/23, he said he had a lot of diarrhea recently (3 BMs/day were diarrhea while others were formed), so he increased amitriptyline to 10 mg 2 pills QHS. He had noticed an improvement over the last 2 days - yesterday was the first day he did not have diarrhea. He had not had any somnolence on the increased dosage of amitriptyline. He previously took Imodium. Hyoscyamine worked for abdominal pain.  HPI: Today, he says had frequent loose BMs last week while he was out of town - 10 BMs/day. His stools were very loose at that time, but not watery. He was on Imodium 1 pill/day during that time which made some difference. He also had a lot of gas that he attributes to certain foods. He will have these bouts of frequent BMs 1 day/week and cannot pinpoint a pattern. His normal bowel habit is 3-4 soft BMs/day. He only has some bleeding with frequent diarrhea. Constipation is rare. He continues on amitriptyline 10 mg 2 pills QHS. Abdominal pain is usually associated with bloating and gas. He takes hyoscyamine 1-2x/week. He no longer avoids dairy, because it did not provide an improvement in his symptoms. He avoids raw vegetables. He is not taking ondansetron right now.     Labs 1/25/23 - Stool study negative. 12/8/22 - Stool study negative. 11/22/22 - Stool O&P negative. 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 Stool study negative. 3/8/22 - Stool study positive for Giardia. 3/11/20 - Stool study negative, Stool O&P negative x3. 2/19/20 - CBC, CMP all normal. 12/17/19 - CMP normal except sodium 147. GC/chlamydia negative.

## 2023-05-24 ENCOUNTER — LAB OUTSIDE AN ENCOUNTER (OUTPATIENT)
Dept: URBAN - METROPOLITAN AREA CLINIC 105 | Facility: CLINIC | Age: 33
End: 2023-05-24

## 2023-05-24 LAB
A/G RATIO: 2
ABSOLUTE BASOPHILS: 32
ABSOLUTE EOSINOPHILS: 91
ABSOLUTE LYMPHOCYTES: 1344
ABSOLUTE MONOCYTES: 273
ABSOLUTE NEUTROPHILS: 1761
ALBUMIN: 4.8
ALKALINE PHOSPHATASE: 66
ALT (SGPT): 14
AST (SGOT): 18
BASOPHILS: 0.9
BILIRUBIN, TOTAL: 0.8
BUN/CREATININE RATIO: (no result)
BUN: 14
CALCIUM: 9.6
CARBON DIOXIDE, TOTAL: 26
CHLORIDE: 106
CREATININE: 0.94
EGFR: 112
EOSINOPHILS: 2.6
GLOBULIN, TOTAL: 2.4
GLUCOSE: 90
HEMATOCRIT: 40.4
HEMOGLOBIN: 14.3
IMMUNOGLOBULIN A, QN, SERUM: 245
LYMPHOCYTES: 38.4
MCH: 31.8
MCHC: 35.4
MCV: 89.8
MONOCYTES: 7.8
MPV: 11.5
NEUTROPHILS: 50.3
PLATELET COUNT: 178
POTASSIUM: 4.3
PROTEIN, TOTAL: 7.2
RDW: 12.6
RED BLOOD CELL COUNT: 4.5
SODIUM: 140
T-TRANSGLUTAMINASE (TTG) IGA: <1
TSH W/REFLEX TO FT4: 0.94
WHITE BLOOD CELL COUNT: 3.5

## 2023-05-27 LAB
ADENOVIRUS F 40/41: NOT DETECTED
CAMPYLOBACTER: NOT DETECTED
CLOSTRIDIUM DIFFICILE: NOT DETECTED
CRYPTOSPORIDIUM: NOT DETECTED
CYCLOSPORA CAYETANESIS: NOT DETECTED
E. COLI O157: (no result)
ENTAMOEBA HISTOLYTICA: NOT DETECTED
ENTAMOEBA HISTOLYTICA: NOT DETECTED
ENTEROAGGREGATIVE E.COLI: NOT DETECTED
ENTEROTOXIGENIC E.COLI: NOT DETECTED
ESCHERICHIA COLI O157: NOT DETECTED
GIARDIA LAMBLIA: NOT DETECTED
NOROVIRUS GI/GII: NOT DETECTED
NOROVIRUS GI/GII: NOT DETECTED
ROTAVIRUS A: NOT DETECTED
SHIGA-LIKE TOXIN PRODUCING E.COLI: NOT DETECTED
SHIGELLA SPP. / ENTEROINVASIVE E.COLI: NOT DETECTED
VIBRIO CHOLERAE: NOT DETECTED
VIBRIO PARAHAEMOLYTICUS: NOT DETECTED
VIBRIO SPP.: NOT DETECTED
YERSINIA ENTEROCOLITICA: NOT DETECTED
YERSINIA ENTEROCOLITICA: NOT DETECTED

## 2023-05-31 LAB — TRICHROME (1): (no result)

## 2023-06-02 ENCOUNTER — TELEPHONE ENCOUNTER (OUTPATIENT)
Dept: URBAN - METROPOLITAN AREA CLINIC 105 | Facility: CLINIC | Age: 33
End: 2023-06-02

## 2023-06-02 RX ORDER — PAROMOMYCIN SULFATE 250 MG/1
3 CAPSULES CAPSULE ORAL
Qty: 63 | Refills: 0 | OUTPATIENT
Start: 2023-06-02 | End: 2023-06-09

## 2023-06-02 RX ORDER — ONDANSETRON 4 MG/1
1 TABLET ON THE TONGUE AND ALLOW TO DISSOLVE TABLET, ORALLY DISINTEGRATING ORAL
Qty: 30 | Refills: 2 | Status: ACTIVE | COMMUNITY
Start: 2022-03-08

## 2023-06-02 RX ORDER — TINIDAZOLE 500 MG/1
4 TABLETS WITH FOOD TABLET ORAL ONCE A DAY
Qty: 12 TABLET | Refills: 0 | OUTPATIENT
Start: 2023-06-02 | End: 2023-06-05

## 2023-06-02 RX ORDER — EMTRICITABINE AND TENOFOVIR DISOPROXIL FUMARATE 200; 300 MG/1; MG/1
1 TABLET TABLET, FILM COATED ORAL ONCE A DAY
Status: ACTIVE | COMMUNITY

## 2023-06-02 RX ORDER — FINASTERIDE 5 MG/1
1 TABLET TABLET, FILM COATED ORAL ONCE A DAY
Status: ACTIVE | COMMUNITY

## 2023-06-02 RX ORDER — ESCITALOPRAM OXALATE 10 MG/1
1 TABLET TABLET, FILM COATED ORAL ONCE A DAY
Status: ACTIVE | COMMUNITY

## 2023-06-02 RX ORDER — AMITRIPTYLINE HYDROCHLORIDE 10 MG/1
2 TABLETS TABLET, FILM COATED ORAL
Qty: 180 | Refills: 3 | Status: ACTIVE | COMMUNITY
Start: 2022-12-02

## 2023-06-14 ENCOUNTER — OFFICE VISIT (OUTPATIENT)
Dept: URBAN - METROPOLITAN AREA CLINIC 105 | Facility: CLINIC | Age: 33
End: 2023-06-14
Payer: COMMERCIAL

## 2023-06-14 VITALS
SYSTOLIC BLOOD PRESSURE: 132 MMHG | HEART RATE: 79 BPM | DIASTOLIC BLOOD PRESSURE: 88 MMHG | HEIGHT: 73 IN | WEIGHT: 185 LBS | BODY MASS INDEX: 24.52 KG/M2

## 2023-06-14 DIAGNOSIS — A06.0: ICD-10-CM

## 2023-06-14 DIAGNOSIS — A04.72 C. DIFFICILE DIARRHEA: ICD-10-CM

## 2023-06-14 DIAGNOSIS — R10.84 ABDOMINAL CRAMPING, GENERALIZED: ICD-10-CM

## 2023-06-14 DIAGNOSIS — E73.9 LACTOSE INTOLERANCE: ICD-10-CM

## 2023-06-14 PROCEDURE — 99213 OFFICE O/P EST LOW 20 MIN: CPT | Performed by: INTERNAL MEDICINE

## 2023-06-14 RX ORDER — AMITRIPTYLINE HYDROCHLORIDE 10 MG/1
2 TABLETS TABLET, FILM COATED ORAL
Qty: 180 | Refills: 3 | COMMUNITY
Start: 2022-12-02

## 2023-06-14 RX ORDER — FINASTERIDE 5 MG/1
1 TABLET TABLET, FILM COATED ORAL ONCE A DAY
COMMUNITY

## 2023-06-14 RX ORDER — EMTRICITABINE AND TENOFOVIR DISOPROXIL FUMARATE 200; 300 MG/1; MG/1
1 TABLET TABLET, FILM COATED ORAL ONCE A DAY
COMMUNITY

## 2023-06-14 RX ORDER — ESCITALOPRAM OXALATE 10 MG/1
1 TABLET TABLET, FILM COATED ORAL ONCE A DAY
COMMUNITY

## 2023-06-14 RX ORDER — ONDANSETRON 4 MG/1
1 TABLET ON THE TONGUE AND ALLOW TO DISSOLVE TABLET, ORALLY DISINTEGRATING ORAL
Qty: 30 | Refills: 2 | COMMUNITY
Start: 2022-03-08

## 2023-06-14 NOTE — HPI-TODAY'S VISIT:
The patient presents on follow-up for diarrhea.  PAST MEDICAL HISTORY: On 3/5/20, the patient reported an onset of constant watery diarrhea 3 weeks ago. He had 4-5 BMs/day with rare nocturnal urgency. He denied traveling before onset and last took abx in October for a tonsillectomy. Stool sample and labs were done with his PCP. They were negative. He was prescribed Cipro and Flagyl he stated. He took it for a week with no benefit. He was then prescribed a 2-week course of Xifaxin TID and referred to me. He had one week left. In the last 3-4 days, he had noticed an improvement in formation and frequency (2-3x/day). He stated he had a sensitive stomach his whole life. Eating something too sweet or oily would cause bloating, cramping, and diarrhea to a lesser frequency. He would note diarrhea 1x/few months. He had a colonoscopy in 2010 for diarrhea. It was negative. He stated he was lactose intolerant. He described both indigestion and abdominal pain as bloating/cramping.  He took Descovy for HIV prevention. He used Humira for psoriasis. Previously, he took it every 2 weeks, but has decreased to 1x/month due to recurrent strep throat. Last use was in Jan 14. He took Viibryd for depression. It was well-controlled. He had a mild allergy to Aleve, he stated. He is a former tobacco user.   On 9/16/21, he noted he did not follow through with the colonoscopy in March of 2020 because of COVID. He saw Dr. Schumacher in follow-up who urged him to return for his colonoscopy given his intermittent diarrhea. He had up to 6-7 stools/day intermittently.  He was currently off Humira.  On 1/25/22, he presented in follow-up.  He saw Dr. Sargent on 1/12/22 noting recurrent C diff tx'ed with vancomycin initially then fidaxomycin.  He had a recurrence of watery diarrhea and has started on a second course of fidaxomycin that Dr. Sargent prescribed just prior to this appt.   He noted his diarrhea had resolved - almost immediately after starting fidaxomycin. He only noted some bloating and abdominal discomfort for the past 2 days, but no diarrhea - 3 BMs QD. He was drinking a probiotic daily.  On 3/8/22, he said he was off Dificid - diarrhea resolved within 1-2 days of starting it. While off Dificid in early Feb, he did not have diarrhea either, but started to note a looser stool 2-2.5 weeks ago. Twelve days ago, he had 10-15 BMs in a day, so he started on the vancomycin prescription. His frequency improved - was having diarrhea 1-2x/day. He started having nausea, fatigue, and abdominal cramping last week. He continued on vancomycin 125 mg QID (started 12 days ago). Yesterday, he had 5 soft BMs QD, today had a formed BM so far (his first formed BM in awhile). He last had watery BMs on Sunday. He denied nocturnal urgency. He was taking a probiotic daily.   ADDENDUM: Stool study positive for Giardia. Negative for C diff. Tx'ed with metronidazole x 10 days and instructed to discontinue vancomycin.  On 3/30/22, he said he finished treatment for Giardia - diarrhea resolved. He now had 2-3 formed BMs QD without diarrhea. Bloating and nausea had resolved.    On 10/25/22, he noted a second bout of Giardia in June tx'ed with metronidazole.  Post stool study in August was negative.  He also notes prior to going to [place] he had a bout of diarrhea and was empirically given a 3 day course of antibiotic.  More recently he noted diarrhea if he had diary even if he took Lactaid which used to work to avoid lactose intolerance.  If he avoided diary he did not have diarrhea.  But he still had some abdominal discomfort across abdomen at umbilicus and bloating.  On 12/2/22, he said he finished abx treatment for endolimax alejandra - finished on Nov 8. He said that his symptoms resolved 2-3 days after starting treatment; however, his symptoms returned within 7 days of finishing abx. He was having abdominal discomfort, gas, and diarrhea. On some days, he had 2-3 BMs QD, but a lot of times could have 8-10 BMs QD. On the days he had 2-3 BMs, he had diarrhea while the he had semi-formed BMs on days he had 8-10 BMs. Hyoscyamine helped with abdominal pain. He could not pinpoint a dietary trigger for his symptoms. He had been taking a probiotic.  On 1/13/23, he said he had started on amitriptyline which had provided a significant benefit. He had some grogginess on the med, but taking it earlier helped. On the medication, he had 1-2 formed BMs QD and had less gas. Since running out of amitriptyline, he had more abdominal discomfort, gas, and diarrhea. He had trialed a few probiotics, but felt bloated on them and noted less of a need for them due to amitriptyline working for him. He had taken hyoscyamine a few times which had helped.  On 2/3/23, he said he had a lot of diarrhea recently (3 BMs/day were diarrhea while others were formed), so he increased amitriptyline to 10 mg 2 pills QHS. He had noticed an improvement over the last 2 days - yesterday was the first day he did not have diarrhea. He had not had any somnolence on the increased dosage of amitriptyline. He previously took Imodium. Hyoscyamine worked for abdominal pain.  On 5/23/23, he said had frequent loose BMs last week while he was out of town - 10 BMs/day. His stools were very loose at that time, but not watery. He was on Imodium 1 pill/day during that time which made some difference. He also had a lot of gas that he attributed to certain foods. He would have these bouts of frequent BMs 1 day/week and could not pinpoint a pattern. His normal bowel habit was 3-4 soft BMs/day. He only has some bleeding with frequent diarrhea. Constipation was rare. He continued on amitriptyline 10 mg 2 pills QHS. Abdominal pain was usually associated with bloating and gas. He took hyoscyamine 1-2x/week. He no longer avoided dairy, because it did not provide an improvement in his symptoms. He avoided raw vegetables. He was not taking ondansetron right now.   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 He had diarrhea for the first days of taking tinidazole x 3 days. For the last 5 days of taking paromomycin (7 day course), he felt much better. He now has regular formed BMs and his bloating has significantly improved.     Labs 5/24/23 - Stool O&P positive for Endolimax alejandra. Stool study negative. 5/23/23 - CBC normal except WBC 3.5. Celiac negative. CMP, TSH/FT4 all normal. 1/25/23 - Stool study negative. 12/8/22 - Stool study negative. 11/22/22 - Stool O&P negative. 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 Stool study negative. 3/8/22 - Stool study positive for Giardia. 3/11/20 - Stool study negative, Stool O&P negative x3. 2/19/20 - CBC, CMP all normal. 12/17/19 - CMP normal except sodium 147. GC/chlamydia negative.

## 2023-07-21 ENCOUNTER — WEB ENCOUNTER (OUTPATIENT)
Dept: URBAN - METROPOLITAN AREA CLINIC 105 | Facility: CLINIC | Age: 33
End: 2023-07-21

## 2023-08-03 ENCOUNTER — LAB OUTSIDE AN ENCOUNTER (OUTPATIENT)
Dept: URBAN - METROPOLITAN AREA CLINIC 105 | Facility: CLINIC | Age: 33
End: 2023-08-03

## 2023-08-03 ENCOUNTER — WEB ENCOUNTER (OUTPATIENT)
Dept: URBAN - METROPOLITAN AREA CLINIC 105 | Facility: CLINIC | Age: 33
End: 2023-08-03

## 2023-08-03 RX ORDER — AMITRIPTYLINE HYDROCHLORIDE 10 MG/1
2 TABLETS TABLET, FILM COATED ORAL
Qty: 180 | Refills: 3 | COMMUNITY
Start: 2022-12-02

## 2023-08-03 RX ORDER — ONDANSETRON 4 MG/1
1 TABLET ON THE TONGUE AND ALLOW TO DISSOLVE TABLET, ORALLY DISINTEGRATING ORAL
Qty: 30 | Refills: 2 | COMMUNITY
Start: 2022-03-08

## 2023-08-03 RX ORDER — FINASTERIDE 5 MG/1
1 TABLET TABLET, FILM COATED ORAL ONCE A DAY
COMMUNITY

## 2023-08-03 RX ORDER — EMTRICITABINE AND TENOFOVIR DISOPROXIL FUMARATE 200; 300 MG/1; MG/1
1 TABLET TABLET, FILM COATED ORAL ONCE A DAY
COMMUNITY

## 2023-08-03 RX ORDER — RIFAXIMIN 550 MG/1
1 TABLET TABLET ORAL THREE TIMES A DAY
Qty: 42 TABLET | Refills: 0 | OUTPATIENT
Start: 2023-08-03 | End: 2023-08-17

## 2023-08-03 RX ORDER — ESCITALOPRAM OXALATE 10 MG/1
1 TABLET TABLET, FILM COATED ORAL ONCE A DAY
COMMUNITY

## 2023-08-06 LAB
ADENOVIRUS F 40/41: NOT DETECTED
CAMPYLOBACTER: NOT DETECTED
CLOSTRIDIUM DIFFICILE: NOT DETECTED
ENTAMOEBA HISTOLYTICA: NOT DETECTED
ENTEROAGGREGATIVE E.COLI: NOT DETECTED
ENTEROTOXIGENIC E.COLI: NOT DETECTED
ESCHERICHIA COLI O157: NOT DETECTED
GIARDIA LAMBLIA: NOT DETECTED
NOROVIRUS GI/GII: NOT DETECTED
ROTAVIRUS A: NOT DETECTED
SALMONELLA SPP.: NOT DETECTED
SHIGA-LIKE TOXIN PRODUCING E.COLI: NOT DETECTED
SHIGELLA SPP. / ENTEROINVASIVE E.COLI: NOT DETECTED
VIBRIO PARAHAEMOLYTICUS: NOT DETECTED
VIBRIO SPP.: NOT DETECTED
YERSINIA ENTEROCOLITICA: NOT DETECTED

## 2023-08-08 LAB — TRICHROME (1): (no result)

## 2023-08-11 ENCOUNTER — TELEPHONE ENCOUNTER (OUTPATIENT)
Dept: URBAN - METROPOLITAN AREA CLINIC 105 | Facility: CLINIC | Age: 33
End: 2023-08-11

## 2023-08-15 ENCOUNTER — OFFICE VISIT (OUTPATIENT)
Dept: URBAN - METROPOLITAN AREA CLINIC 105 | Facility: CLINIC | Age: 33
End: 2023-08-15
Payer: COMMERCIAL

## 2023-08-15 VITALS
BODY MASS INDEX: 24.01 KG/M2 | HEIGHT: 73 IN | WEIGHT: 181.2 LBS | TEMPERATURE: 98.1 F | DIASTOLIC BLOOD PRESSURE: 79 MMHG | SYSTOLIC BLOOD PRESSURE: 116 MMHG | HEART RATE: 85 BPM

## 2023-08-15 DIAGNOSIS — R19.7 DIARRHEA, UNSPECIFIED TYPE: ICD-10-CM

## 2023-08-15 DIAGNOSIS — R10.9 ABDOMINAL DISCOMFORT: ICD-10-CM

## 2023-08-15 DIAGNOSIS — A04.72 C. DIFFICILE DIARRHEA: ICD-10-CM

## 2023-08-15 DIAGNOSIS — A07.1 GIARDIA: ICD-10-CM

## 2023-08-15 DIAGNOSIS — A06.0: ICD-10-CM

## 2023-08-15 DIAGNOSIS — E73.9 LACTOSE INTOLERANCE: ICD-10-CM

## 2023-08-15 PROCEDURE — 99214 OFFICE O/P EST MOD 30 MIN: CPT | Performed by: INTERNAL MEDICINE

## 2023-08-15 RX ORDER — AMITRIPTYLINE HYDROCHLORIDE 10 MG/1
3 TABLETS TABLET, FILM COATED ORAL
Qty: 90 | Refills: 1 | OUTPATIENT
Start: 2022-12-02

## 2023-08-15 RX ORDER — RIFAXIMIN 550 MG/1
1 TABLET TABLET ORAL THREE TIMES A DAY
Qty: 42 TABLET | Refills: 0 | Status: ACTIVE | COMMUNITY
Start: 2023-08-03 | End: 2023-08-17

## 2023-08-15 RX ORDER — EMTRICITABINE AND TENOFOVIR DISOPROXIL FUMARATE 200; 300 MG/1; MG/1
1 TABLET TABLET, FILM COATED ORAL ONCE A DAY
Status: ACTIVE | COMMUNITY

## 2023-08-15 RX ORDER — AMITRIPTYLINE HYDROCHLORIDE 10 MG/1
2 TABLETS TABLET, FILM COATED ORAL
Qty: 180 | Refills: 3 | Status: ACTIVE | COMMUNITY
Start: 2022-12-02

## 2023-08-15 RX ORDER — ESCITALOPRAM OXALATE 10 MG/1
1 TABLET TABLET, FILM COATED ORAL ONCE A DAY
Status: ACTIVE | COMMUNITY

## 2023-08-15 RX ORDER — FINASTERIDE 5 MG/1
1 TABLET TABLET, FILM COATED ORAL ONCE A DAY
Status: ACTIVE | COMMUNITY

## 2023-08-15 RX ORDER — ONDANSETRON 4 MG/1
1 TABLET ON THE TONGUE AND ALLOW TO DISSOLVE TABLET, ORALLY DISINTEGRATING ORAL
Qty: 30 | Refills: 2 | Status: ACTIVE | COMMUNITY
Start: 2022-03-08

## 2023-08-15 NOTE — HPI-TODAY'S VISIT:
The patient presents on follow-up for diarrhea.  PAST MEDICAL HISTORY: On 3/5/20, the patient reported an onset of constant watery diarrhea 3 weeks ago. He had 4-5 BMs/day with rare nocturnal urgency. He denied traveling before onset and last took abx in October for a tonsillectomy. Stool sample and labs were done with his PCP. They were negative. He was prescribed Cipro and Flagyl he stated. He took it for a week with no benefit. He was then prescribed a 2-week course of Xifaxin TID and referred to me. He had one week left. In the last 3-4 days, he had noticed an improvement in formation and frequency (2-3x/day). He stated he had a sensitive stomach his whole life. Eating something too sweet or oily would cause bloating, cramping, and diarrhea to a lesser frequency. He would note diarrhea 1x/few months. He had a colonoscopy in 2010 for diarrhea. It was negative. He stated he was lactose intolerant. He described both indigestion and abdominal pain as bloating/cramping.  He took Descovy for HIV prevention. He used Humira for psoriasis. Previously, he took it every 2 weeks, but has decreased to 1x/month due to recurrent strep throat. Last use was in Jan 14. He took Viibryd for depression. It was well-controlled. He had a mild allergy to Aleve, he stated. He is a former tobacco user.   On 9/16/21, he noted he did not follow through with the colonoscopy in March of 2020 because of COVID. He saw Dr. Schumacher in follow-up who urged him to return for his colonoscopy given his intermittent diarrhea. He had up to 6-7 stools/day intermittently.  He was currently off Humira.  On 1/25/22, he presented in follow-up.  He saw Dr. Sargent on 1/12/22 noting recurrent C diff tx'ed with vancomycin initially then fidaxomycin.  He had a recurrence of watery diarrhea and has started on a second course of fidaxomycin that Dr. Sargent prescribed just prior to this appt.   He noted his diarrhea had resolved - almost immediately after starting fidaxomycin. He only noted some bloating and abdominal discomfort for the past 2 days, but no diarrhea - 3 BMs QD. He was drinking a probiotic daily.  On 3/8/22, he said he was off Dificid - diarrhea resolved within 1-2 days of starting it. While off Dificid in early Feb, he did not have diarrhea either, but started to note a looser stool 2-2.5 weeks ago. Twelve days ago, he had 10-15 BMs in a day, so he started on the vancomycin prescription. His frequency improved - was having diarrhea 1-2x/day. He started having nausea, fatigue, and abdominal cramping last week. He continued on vancomycin 125 mg QID (started 12 days ago). Yesterday, he had 5 soft BMs QD, today had a formed BM so far (his first formed BM in awhile). He last had watery BMs on Sunday. He denied nocturnal urgency. He was taking a probiotic daily.   ADDENDUM: Stool study positive for Giardia. Negative for C diff. Tx'ed with metronidazole x 10 days and instructed to discontinue vancomycin.  On 3/30/22, he said he finished treatment for Giardia - diarrhea resolved. He now had 2-3 formed BMs QD without diarrhea. Bloating and nausea had resolved.    On 10/25/22, he noted a second bout of Giardia in June tx'ed with metronidazole.  Post stool study in August was negative.  He also notes prior to going to [place] he had a bout of diarrhea and was empirically given a 3 day course of antibiotic.  More recently he noted diarrhea if he had diary even if he took Lactaid which used to work to avoid lactose intolerance.  If he avoided diary he did not have diarrhea.  But he still had some abdominal discomfort across abdomen at umbilicus and bloating.  On 12/2/22, he said he finished abx treatment for endolimax alejandra - finished on Nov 8. He said that his symptoms resolved 2-3 days after starting treatment; however, his symptoms returned within 7 days of finishing abx. He was having abdominal discomfort, gas, and diarrhea. On some days, he had 2-3 BMs QD, but a lot of times could have 8-10 BMs QD. On the days he had 2-3 BMs, he had diarrhea while the he had semi-formed BMs on days he had 8-10 BMs. Hyoscyamine helped with abdominal pain. He could not pinpoint a dietary trigger for his symptoms. He had been taking a probiotic.  On 1/13/23, he said he had started on amitriptyline which had provided a significant benefit. He had some grogginess on the med, but taking it earlier helped. On the medication, he had 1-2 formed BMs QD and had less gas. Since running out of amitriptyline, he had more abdominal discomfort, gas, and diarrhea. He had trialed a few probiotics, but felt bloated on them and noted less of a need for them due to amitriptyline working for him. He had taken hyoscyamine a few times which had helped.  On 2/3/23, he said he had a lot of diarrhea recently (3 BMs/day were diarrhea while others were formed), so he increased amitriptyline to 10 mg 2 pills QHS. He had noticed an improvement over the last 2 days - yesterday was the first day he did not have diarrhea. He had not had any somnolence on the increased dosage of amitriptyline. He previously took Imodium. Hyoscyamine worked for abdominal pain.  On 5/23/23, he said had frequent loose BMs last week while he was out of town - 10 BMs/day. His stools were very loose at that time, but not watery. He was on Imodium 1 pill/day during that time which made some difference. He also had a lot of gas that he attributed to certain foods. He would have these bouts of frequent BMs 1 day/week and could not pinpoint a pattern. His normal bowel habit was 3-4 soft BMs/day. He only has some bleeding with frequent diarrhea. Constipation was rare. He continued on amitriptyline 10 mg 2 pills QHS. Abdominal pain was usually associated with bloating and gas. He took hyoscyamine 1-2x/week. He no longer avoided dairy, because it did not provide an improvement in his symptoms. He avoided raw vegetables. He was not taking ondansetron right now.   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 He had diarrhea for the first days of taking tinidazole x 3 days. For the last 5 days of taking paromomycin (7 day course), he felt much better. He now had regular formed BMs and his bloating had significantly improved.  HPI: Today, he says he has recurrent symptoms off abx. He took Xifaxin which helped, but his symptoms did not resolved. While on Xifaxin, he still had a lot of cramping. About a week ago, he had watery diarrhea that he took Imodium 2 pills for. The Imodium helped with the cramping and diarrhea, but his symptoms started returning 1.5 days after Imodium use. Before having that episode of diarrhea, he had 5-6 formed BMs/day with cramping. No diarrhea since then, he now has softer stools. Previously, he would have watery diarrhea 1-2x/week. He has occasional gas. He continues on amitriptyline 10 mg 2 pills QHS with some grogginess the next day. Hyoscyamine with Gas-x helped in the past for cramping.      Labs 8/3/23 - Stool study, stool O&P x3 all negative. 5/24/23 - Stool O&P positive for Endolimax alejandra. Stool study negative. 5/23/23 - CBC normal except WBC 3.5. Celiac negative. CMP, TSH/FT4 all normal. 1/25/23 - Stool study negative. 12/8/22 - Stool study negative. 11/22/22 - Stool O&P negative. 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 Stool study negative. 3/8/22 - Stool study positive for Giardia. 3/11/20 - Stool study negative, Stool O&P negative x3. 2/19/20 - CBC, CMP all normal. 12/17/19 - CMP normal except sodium 147. GC/chlamydia negative. chest pain Epig pain and N/V

## 2023-09-27 ENCOUNTER — OFFICE VISIT (OUTPATIENT)
Dept: URBAN - METROPOLITAN AREA CLINIC 105 | Facility: CLINIC | Age: 33
End: 2023-09-27
Payer: COMMERCIAL

## 2023-09-27 VITALS
BODY MASS INDEX: 24.25 KG/M2 | HEIGHT: 73 IN | TEMPERATURE: 96.9 F | HEART RATE: 79 BPM | WEIGHT: 183 LBS | SYSTOLIC BLOOD PRESSURE: 144 MMHG | DIASTOLIC BLOOD PRESSURE: 88 MMHG

## 2023-09-27 DIAGNOSIS — A07.1 GIARDIA: ICD-10-CM

## 2023-09-27 DIAGNOSIS — E73.9 LACTOSE INTOLERANCE: ICD-10-CM

## 2023-09-27 DIAGNOSIS — R10.9 ABDOMINAL DISCOMFORT: ICD-10-CM

## 2023-09-27 DIAGNOSIS — A04.72 C. DIFFICILE DIARRHEA: ICD-10-CM

## 2023-09-27 DIAGNOSIS — A06.0: ICD-10-CM

## 2023-09-27 DIAGNOSIS — R19.7 DIARRHEA, UNSPECIFIED TYPE: ICD-10-CM

## 2023-09-27 PROCEDURE — 99213 OFFICE O/P EST LOW 20 MIN: CPT | Performed by: INTERNAL MEDICINE

## 2023-09-27 RX ORDER — EMTRICITABINE AND TENOFOVIR DISOPROXIL FUMARATE 200; 300 MG/1; MG/1
1 TABLET TABLET, FILM COATED ORAL ONCE A DAY
Status: ACTIVE | COMMUNITY

## 2023-09-27 RX ORDER — ONDANSETRON 4 MG/1
1 TABLET ON THE TONGUE AND ALLOW TO DISSOLVE TABLET, ORALLY DISINTEGRATING ORAL
Qty: 30 | Refills: 2 | Status: ACTIVE | COMMUNITY
Start: 2022-03-08

## 2023-09-27 RX ORDER — ESCITALOPRAM OXALATE 10 MG/1
1 TABLET TABLET, FILM COATED ORAL ONCE A DAY
Status: ACTIVE | COMMUNITY

## 2023-09-27 RX ORDER — AMITRIPTYLINE HYDROCHLORIDE 10 MG/1
3 TABLETS TABLET, FILM COATED ORAL
Qty: 90 | Refills: 1 | Status: ACTIVE | COMMUNITY
Start: 2022-12-02

## 2023-09-27 RX ORDER — AMITRIPTYLINE HYDROCHLORIDE 10 MG/1
3 TABLETS TABLET, FILM COATED ORAL
Qty: 90 | Refills: 1 | OUTPATIENT

## 2023-09-27 RX ORDER — FINASTERIDE 5 MG/1
1 TABLET TABLET, FILM COATED ORAL ONCE A DAY
Status: ACTIVE | COMMUNITY

## 2023-09-27 NOTE — HPI-TODAY'S VISIT:
The patient presents on follow-up for diarrhea.  PAST MEDICAL HISTORY: On 3/5/20, the patient reported an onset of constant watery diarrhea 3 weeks ago. He had 4-5 BMs/day with rare nocturnal urgency. He denied traveling before onset and last took abx in October for a tonsillectomy. Stool sample and labs were done with his PCP. They were negative. He was prescribed Cipro and Flagyl he stated. He took it for a week with no benefit. He was then prescribed a 2-week course of Xifaxin TID and referred to me. He had one week left. In the last 3-4 days, he had noticed an improvement in formation and frequency (2-3x/day). He stated he had a sensitive stomach his whole life. Eating something too sweet or oily would cause bloating, cramping, and diarrhea to a lesser frequency. He would note diarrhea 1x/few months. He had a colonoscopy in 2010 for diarrhea. It was negative. He stated he was lactose intolerant. He described both indigestion and abdominal pain as bloating/cramping.  He took Descovy for HIV prevention. He used Humira for psoriasis. Previously, he took it every 2 weeks, but has decreased to 1x/month due to recurrent strep throat. Last use was in Jan 14. He took Viibryd for depression. It was well-controlled. He had a mild allergy to Aleve, he stated. He is a former tobacco user.   On 9/16/21, he noted he did not follow through with the colonoscopy in March of 2020 because of COVID. He saw Dr. Schumacher in follow-up who urged him to return for his colonoscopy given his intermittent diarrhea. He had up to 6-7 stools/day intermittently.  He was currently off Humira.  On 1/25/22, he presented in follow-up.  He saw Dr. Sargent on 1/12/22 noting recurrent C diff tx'ed with vancomycin initially then fidaxomycin.  He had a recurrence of watery diarrhea and has started on a second course of fidaxomycin that Dr. Sargent prescribed just prior to this appt.   He noted his diarrhea had resolved - almost immediately after starting fidaxomycin. He only noted some bloating and abdominal discomfort for the past 2 days, but no diarrhea - 3 BMs QD. He was drinking a probiotic daily.  On 3/8/22, he said he was off Dificid - diarrhea resolved within 1-2 days of starting it. While off Dificid in early Feb, he did not have diarrhea either, but started to note a looser stool 2-2.5 weeks ago. Twelve days ago, he had 10-15 BMs in a day, so he started on the vancomycin prescription. His frequency improved - was having diarrhea 1-2x/day. He started having nausea, fatigue, and abdominal cramping last week. He continued on vancomycin 125 mg QID (started 12 days ago). Yesterday, he had 5 soft BMs QD, today had a formed BM so far (his first formed BM in awhile). He last had watery BMs on Sunday. He denied nocturnal urgency. He was taking a probiotic daily.   ADDENDUM: Stool study positive for Giardia. Negative for C diff. Tx'ed with metronidazole x 10 days and instructed to discontinue vancomycin.  On 3/30/22, he said he finished treatment for Giardia - diarrhea resolved. He now had 2-3 formed BMs QD without diarrhea. Bloating and nausea had resolved.    On 10/25/22, he noted a second bout of Giardia in June tx'ed with metronidazole.  Post stool study in August was negative.  He also notes prior to going to [place] he had a bout of diarrhea and was empirically given a 3 day course of antibiotic.  More recently he noted diarrhea if he had diary even if he took Lactaid which used to work to avoid lactose intolerance.  If he avoided diary he did not have diarrhea.  But he still had some abdominal discomfort across abdomen at umbilicus and bloating.  On 12/2/22, he said he finished abx treatment for endolimax alejandra - finished on Nov 8. He said that his symptoms resolved 2-3 days after starting treatment; however, his symptoms returned within 7 days of finishing abx. He was having abdominal discomfort, gas, and diarrhea. On some days, he had 2-3 BMs QD, but a lot of times could have 8-10 BMs QD. On the days he had 2-3 BMs, he had diarrhea while the he had semi-formed BMs on days he had 8-10 BMs. Hyoscyamine helped with abdominal pain. He could not pinpoint a dietary trigger for his symptoms. He had been taking a probiotic.  On 1/13/23, he said he had started on amitriptyline which had provided a significant benefit. He had some grogginess on the med, but taking it earlier helped. On the medication, he had 1-2 formed BMs QD and had less gas. Since running out of amitriptyline, he had more abdominal discomfort, gas, and diarrhea. He had trialed a few probiotics, but felt bloated on them and noted less of a need for them due to amitriptyline working for him. He had taken hyoscyamine a few times which had helped.  On 2/3/23, he said he had a lot of diarrhea recently (3 BMs/day were diarrhea while others were formed), so he increased amitriptyline to 10 mg 2 pills QHS. He had noticed an improvement over the last 2 days - yesterday was the first day he did not have diarrhea. He had not had any somnolence on the increased dosage of amitriptyline. He previously took Imodium. Hyoscyamine worked for abdominal pain.  On 5/23/23, he said had frequent loose BMs last week while he was out of town - 10 BMs/day. His stools were very loose at that time, but not watery. He was on Imodium 1 pill/day during that time which made some difference. He also had a lot of gas that he attributed to certain foods. He would have these bouts of frequent BMs 1 day/week and could not pinpoint a pattern. His normal bowel habit was 3-4 soft BMs/day. He only has some bleeding with frequent diarrhea. Constipation was rare. He continued on amitriptyline 10 mg 2 pills QHS. Abdominal pain was usually associated with bloating and gas. He took hyoscyamine 1-2x/week. He no longer avoided dairy, because it did not provide an improvement in his symptoms. He avoided raw vegetables. He was not taking ondansetron right now.   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 He had diarrhea for the first days of taking tinidazole x 3 days. For the last 5 days of taking paromomycin (7 day course), he felt much better. He now had regular formed BMs and his bloating had significantly improved.  On 8/15/23, he said he had recurrent symptoms off abx. He took Xifaxin which helped, but his symptoms did not resolve. While on Xifaxin, he still had a lot of cramping. About a week ago, he had watery diarrhea that he took Imodium 2 pills for. The Imodium helped with the cramping and diarrhea, but his symptoms started returning 1.5 days after Imodium use. Before having that episode of diarrhea, he had 5-6 formed BMs/day with cramping. No diarrhea since then, he now had softer stools. Previously, he would have watery diarrhea 1-2x/week. He had occasional gas. He continued on amitriptyline 10 mg 2 pills QHS with some grogginess the next day. Hyoscyamine with Gas-x helped in the past for cramping.      HPI: Today, he says he is using 2-3 pills in a day - 1 pill then 2 pills a couple hours later if 1 does not work. He increased amitriptyline to 10 mg 3 pills QD, but is unsure if he notices a difference. He feels more groggy on this dosage. No dry mouth on amitriptyline. He has not tried 2.5 pills instead of 3. He is having soft stools, no watery. He has abdominal discomfort 1x/week that is usually associated with softer stools or gas. He manages his abdominal discomfort with Imodium or Gas-x.   Labs 8/3/23 - Stool study, stool O&P x3 all negative. 5/24/23 - Stool O&P positive for Endolimax alejandra. Stool study negative. 5/23/23 - CBC normal except WBC 3.5. Celiac negative. CMP, TSH/FT4 all normal. 1/25/23 - Stool study negative. 12/8/22 - Stool study negative. 11/22/22 - Stool O&P negative. 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 Stool study negative. 3/8/22 - Stool study positive for Giardia. 3/11/20 - Stool study negative, Stool O&P negative x3. 2/19/20 - CBC, CMP all normal. 12/17/19 - CMP normal except sodium 147. GC/chlamydia negative.

## 2023-09-28 ENCOUNTER — ERX REFILL RESPONSE (OUTPATIENT)
Dept: URBAN - METROPOLITAN AREA CLINIC 105 | Facility: CLINIC | Age: 33
End: 2023-09-28

## 2023-09-28 RX ORDER — AMITRIPTYLINE HYDROCHLORIDE 10 MG/1
3 TABLETS TABLET, FILM COATED ORAL
Qty: 90 | Refills: 1 | OUTPATIENT

## 2023-09-28 RX ORDER — AMITRIPTYLINE HYDROCHLORIDE 10 MG/1
TAKE 3 TABLETS BY MOUTH EVERY DAY AT BEDTIME TABLET, FILM COATED ORAL
Qty: 270 TABLET | Refills: 1 | OUTPATIENT

## 2023-12-19 ENCOUNTER — DASHBOARD ENCOUNTERS (OUTPATIENT)
Age: 33
End: 2023-12-19

## 2023-12-19 ENCOUNTER — OFFICE VISIT (OUTPATIENT)
Dept: URBAN - METROPOLITAN AREA CLINIC 105 | Facility: CLINIC | Age: 33
End: 2023-12-19
Payer: COMMERCIAL

## 2023-12-19 VITALS
BODY MASS INDEX: 24.39 KG/M2 | HEART RATE: 96 BPM | WEIGHT: 184 LBS | TEMPERATURE: 97.1 F | SYSTOLIC BLOOD PRESSURE: 125 MMHG | DIASTOLIC BLOOD PRESSURE: 79 MMHG | HEIGHT: 73 IN

## 2023-12-19 DIAGNOSIS — A06.0: ICD-10-CM

## 2023-12-19 DIAGNOSIS — E73.9 LACTOSE INTOLERANCE: ICD-10-CM

## 2023-12-19 DIAGNOSIS — A04.72 C. DIFFICILE DIARRHEA: ICD-10-CM

## 2023-12-19 DIAGNOSIS — A07.1 GIARDIA: ICD-10-CM

## 2023-12-19 DIAGNOSIS — R19.7 DIARRHEA, UNSPECIFIED TYPE: ICD-10-CM

## 2023-12-19 DIAGNOSIS — R10.9 ABDOMINAL DISCOMFORT: ICD-10-CM

## 2023-12-19 PROCEDURE — 99203 OFFICE O/P NEW LOW 30 MIN: CPT | Performed by: INTERNAL MEDICINE

## 2023-12-19 RX ORDER — ESCITALOPRAM OXALATE 10 MG/1
1 TABLET TABLET, FILM COATED ORAL ONCE A DAY
Status: ACTIVE | COMMUNITY

## 2023-12-19 RX ORDER — AMITRIPTYLINE HYDROCHLORIDE 10 MG/1
TAKE 3 TABLETS BY MOUTH EVERY DAY AT BEDTIME TABLET, FILM COATED ORAL
Qty: 270 TABLET | Refills: 1 | Status: ACTIVE | COMMUNITY

## 2023-12-19 RX ORDER — EMTRICITABINE AND TENOFOVIR DISOPROXIL FUMARATE 200; 300 MG/1; MG/1
1 TABLET TABLET, FILM COATED ORAL ONCE A DAY
Status: ACTIVE | COMMUNITY

## 2023-12-19 RX ORDER — ONDANSETRON 4 MG/1
1 TABLET ON THE TONGUE AND ALLOW TO DISSOLVE TABLET, ORALLY DISINTEGRATING ORAL
Qty: 30 | Refills: 2 | Status: ACTIVE | COMMUNITY
Start: 2022-03-08

## 2023-12-19 RX ORDER — FINASTERIDE 5 MG/1
1 TABLET TABLET, FILM COATED ORAL ONCE A DAY
Status: ACTIVE | COMMUNITY

## 2023-12-19 NOTE — HPI-TODAY'S VISIT:
The patient presents on follow-up for diarrhea.  PAST MEDICAL HISTORY: On 3/5/20, the patient reported an onset of constant watery diarrhea 3 weeks ago. He had 4-5 BMs/day with rare nocturnal urgency. He denied traveling before onset and last took abx in October for a tonsillectomy. Stool sample and labs were done with his PCP. They were negative. He was prescribed Cipro and Flagyl he stated. He took it for a week with no benefit. He was then prescribed a 2-week course of Xifaxin TID and referred to me. He had one week left. In the last 3-4 days, he had noticed an improvement in formation and frequency (2-3x/day). He stated he had a sensitive stomach his whole life. Eating something too sweet or oily would cause bloating, cramping, and diarrhea to a lesser frequency. He would note diarrhea 1x/few months. He had a colonoscopy in 2010 for diarrhea. It was negative. He stated he was lactose intolerant. He described both indigestion and abdominal pain as bloating/cramping.  He took Descovy for HIV prevention. He used Humira for psoriasis. Previously, he took it every 2 weeks, but has decreased to 1x/month due to recurrent strep throat. Last use was in Jan 14. He took Viibryd for depression. It was well-controlled. He had a mild allergy to Aleve, he stated. He is a former tobacco user.   On 9/16/21, he noted he did not follow through with the colonoscopy in March of 2020 because of COVID. He saw Dr. Schumacher in follow-up who urged him to return for his colonoscopy given his intermittent diarrhea. He had up to 6-7 stools/day intermittently.  He was currently off Humira.  On 1/25/22, he presented in follow-up.  He saw Dr. Sargent on 1/12/22 noting recurrent C diff tx'ed with vancomycin initially then fidaxomycin.  He had a recurrence of watery diarrhea and has started on a second course of fidaxomycin that Dr. Sargent prescribed just prior to this appt.   He noted his diarrhea had resolved - almost immediately after starting fidaxomycin. He only noted some bloating and abdominal discomfort for the past 2 days, but no diarrhea - 3 BMs QD. He was drinking a probiotic daily.  On 3/8/22, he said he was off Dificid - diarrhea resolved within 1-2 days of starting it. While off Dificid in early Feb, he did not have diarrhea either, but started to note a looser stool 2-2.5 weeks ago. Twelve days ago, he had 10-15 BMs in a day, so he started on the vancomycin prescription. His frequency improved - was having diarrhea 1-2x/day. He started having nausea, fatigue, and abdominal cramping last week. He continued on vancomycin 125 mg QID (started 12 days ago). Yesterday, he had 5 soft BMs QD, today had a formed BM so far (his first formed BM in awhile). He last had watery BMs on Sunday. He denied nocturnal urgency. He was taking a probiotic daily.   ADDENDUM: Stool study positive for Giardia. Negative for C diff. Tx'ed with metronidazole x 10 days and instructed to discontinue vancomycin.  On 3/30/22, he said he finished treatment for Giardia - diarrhea resolved. He now had 2-3 formed BMs QD without diarrhea. Bloating and nausea had resolved.    On 10/25/22, he noted a second bout of Giardia in June tx'ed with metronidazole.  Post stool study in August was negative.  He also notes prior to going to [place] he had a bout of diarrhea and was empirically given a 3 day course of antibiotic.  More recently he noted diarrhea if he had diary even if he took Lactaid which used to work to avoid lactose intolerance.  If he avoided diary he did not have diarrhea.  But he still had some abdominal discomfort across abdomen at umbilicus and bloating.  On 12/2/22, he said he finished abx treatment for endolimax alejandra - finished on Nov 8. He said that his symptoms resolved 2-3 days after starting treatment; however, his symptoms returned within 7 days of finishing abx. He was having abdominal discomfort, gas, and diarrhea. On some days, he had 2-3 BMs QD, but a lot of times could have 8-10 BMs QD. On the days he had 2-3 BMs, he had diarrhea while the he had semi-formed BMs on days he had 8-10 BMs. Hyoscyamine helped with abdominal pain. He could not pinpoint a dietary trigger for his symptoms. He had been taking a probiotic.  On 1/13/23, he said he had started on amitriptyline which had provided a significant benefit. He had some grogginess on the med, but taking it earlier helped. On the medication, he had 1-2 formed BMs QD and had less gas. Since running out of amitriptyline, he had more abdominal discomfort, gas, and diarrhea. He had trialed a few probiotics, but felt bloated on them and noted less of a need for them due to amitriptyline working for him. He had taken hyoscyamine a few times which had helped.  On 2/3/23, he said he had a lot of diarrhea recently (3 BMs/day were diarrhea while others were formed), so he increased amitriptyline to 10 mg 2 pills QHS. He had noticed an improvement over the last 2 days - yesterday was the first day he did not have diarrhea. He had not had any somnolence on the increased dosage of amitriptyline. He previously took Imodium. Hyoscyamine worked for abdominal pain.  On 5/23/23, he said had frequent loose BMs last week while he was out of town - 10 BMs/day. His stools were very loose at that time, but not watery. He was on Imodium 1 pill/day during that time which made some difference. He also had a lot of gas that he attributed to certain foods. He would have these bouts of frequent BMs 1 day/week and could not pinpoint a pattern. His normal bowel habit was 3-4 soft BMs/day. He only has some bleeding with frequent diarrhea. Constipation was rare. He continued on amitriptyline 10 mg 2 pills QHS. Abdominal pain was usually associated with bloating and gas. He took hyoscyamine 1-2x/week. He no longer avoided dairy, because it did not provide an improvement in his symptoms. He avoided raw vegetables. He was not taking ondansetron right now.   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 He had diarrhea for the first days of taking tinidazole x 3 days. For the last 5 days of taking paromomycin (7 day course), he felt much better. He now had regular formed BMs and his bloating had significantly improved.  On 8/15/23, he said he had recurrent symptoms off abx. He took Xifaxin which helped, but his symptoms did not resolve. While on Xifaxin, he still had a lot of cramping. About a week ago, he had watery diarrhea that he took Imodium 2 pills for. The Imodium helped with the cramping and diarrhea, but his symptoms started returning 1.5 days after Imodium use. Before having that episode of diarrhea, he had 5-6 formed BMs/day with cramping. No diarrhea since then, he now had softer stools. Previously, he would have watery diarrhea 1-2x/week. He had occasional gas. He continued on amitriptyline 10 mg 2 pills QHS with some grogginess the next day. Hyoscyamine with Gas-x helped in the past for cramping.      On 9/27/23, he said he was using 2-3 pills in a day - 1 pill then 2 pills a couple hours later if 1 does not work. He increased amitriptyline to 10 mg 3 pills QD, but was unsure if he noticed a difference. He felt more groggy on this dosage. No dry mouth on amitriptyline. He had not tried 2.5 pills instead of 3. He was having soft stools, no watery. He had abdominal discomfort 1x/week that was usually associated with softer stools or gas. He managed his abdominal discomfort with Imodium or Gas-x.   HPI: Today, he says he alternates amitriptyline b/w 2 pills and 3 pills in a day. No significant somnolence. 1-2x/week he will take Imodium for a flare-up. He usually takes 2 pills in a day, but may take another 2 later on in the day which has only happened 2x since his last visit.  Not using hyoscyamine, not needing ondansetron.   Labs 8/3/23 - Stool study, stool O&P x3 all negative. 5/24/23 - Stool O&P positive for Endolimax alejandra. Stool study negative. 5/23/23 - CBC normal except WBC 3.5. Celiac negative. CMP, TSH/FT4 all normal. 1/25/23 - Stool study negative. 12/8/22 - Stool study negative. 11/22/22 - Stool O&P negative. 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 Stool study negative. 3/8/22 - Stool study positive for Giardia. 3/11/20 - Stool study negative, Stool O&P negative x3. 2/19/20 - CBC, CMP all normal. 12/17/19 - CMP normal except sodium 147. GC/chlamydia negative.

## 2024-06-05 ENCOUNTER — OFFICE VISIT (OUTPATIENT)
Dept: URBAN - METROPOLITAN AREA CLINIC 105 | Facility: CLINIC | Age: 34
End: 2024-06-05
Payer: COMMERCIAL

## 2024-06-05 VITALS
SYSTOLIC BLOOD PRESSURE: 115 MMHG | TEMPERATURE: 97.7 F | BODY MASS INDEX: 24.7 KG/M2 | HEIGHT: 73 IN | DIASTOLIC BLOOD PRESSURE: 79 MMHG | WEIGHT: 186.4 LBS | HEART RATE: 97 BPM

## 2024-06-05 DIAGNOSIS — R19.7 ACUTE DIARRHEA: ICD-10-CM

## 2024-06-05 PROCEDURE — 99214 OFFICE O/P EST MOD 30 MIN: CPT | Performed by: INTERNAL MEDICINE

## 2024-06-05 RX ORDER — ESCITALOPRAM OXALATE 10 MG/1
1 TABLET TABLET, FILM COATED ORAL ONCE A DAY
Status: ACTIVE | COMMUNITY

## 2024-06-05 RX ORDER — FINASTERIDE 5 MG/1
1 TABLET TABLET, FILM COATED ORAL ONCE A DAY
Status: ACTIVE | COMMUNITY

## 2024-06-05 RX ORDER — EMTRICITABINE AND TENOFOVIR DISOPROXIL FUMARATE 200; 300 MG/1; MG/1
1 TABLET TABLET, FILM COATED ORAL ONCE A DAY
Status: ACTIVE | COMMUNITY

## 2024-06-05 RX ORDER — CIPROFLOXACIN HYDROCHLORIDE 500 MG/1
1 TABLET TABLET, FILM COATED ORAL TWICE A DAY
Qty: 10 TABLET | Refills: 0 | OUTPATIENT
Start: 2024-06-05 | End: 2024-06-10

## 2024-06-05 RX ORDER — AMITRIPTYLINE HYDROCHLORIDE 10 MG/1
TAKE 3 TABLETS BY MOUTH EVERY DAY AT BEDTIME TABLET, FILM COATED ORAL
Qty: 270 TABLET | Refills: 1 | Status: ACTIVE | COMMUNITY

## 2024-06-05 RX ORDER — ONDANSETRON 4 MG/1
1 TABLET ON THE TONGUE AND ALLOW TO DISSOLVE TABLET, ORALLY DISINTEGRATING ORAL
Qty: 30 | Refills: 2 | Status: ACTIVE | COMMUNITY
Start: 2022-03-08

## 2024-06-05 NOTE — HPI-TODAY'S VISIT:
The patient presents on follow-up for diarrhea.  PAST MEDICAL HISTORY: On 3/5/20, the patient reported an onset of constant watery diarrhea 3 weeks ago. He had 4-5 BMs/day with rare nocturnal urgency. He denied traveling before onset and last took abx in October for a tonsillectomy. Stool sample and labs were done with his PCP. They were negative. He was prescribed Cipro and Flagyl he stated. He took it for a week with no benefit. He was then prescribed a 2-week course of Xifaxin TID and referred to me. He had one week left. In the last 3-4 days, he had noticed an improvement in formation and frequency (2-3x/day). He stated he had a sensitive stomach his whole life. Eating something too sweet or oily would cause bloating, cramping, and diarrhea to a lesser frequency. He would note diarrhea 1x/few months. He had a colonoscopy in 2010 for diarrhea. It was negative. He stated he was lactose intolerant. He described both indigestion and abdominal pain as bloating/cramping.  He took Descovy for HIV prevention. He used Humira for psoriasis. Previously, he took it every 2 weeks, but has decreased to 1x/month due to recurrent strep throat. Last use was in Jan 14. He took Viibryd for depression. It was well-controlled. He had a mild allergy to Aleve, he stated. He is a former tobacco user.   On 9/16/21, he noted he did not follow through with the colonoscopy in March of 2020 because of COVID. He saw Dr. Schumacher in follow-up who urged him to return for his colonoscopy given his intermittent diarrhea. He had up to 6-7 stools/day intermittently.  He was currently off Humira.  On 1/25/22, he presented in follow-up.  He saw Dr. Sargent on 1/12/22 noting recurrent C diff tx'ed with vancomycin initially then fidaxomycin.  He had a recurrence of watery diarrhea and has started on a second course of fidaxomycin that Dr. Sargent prescribed just prior to this appt.   He noted his diarrhea had resolved - almost immediately after starting fidaxomycin. He only noted some bloating and abdominal discomfort for the past 2 days, but no diarrhea - 3 BMs QD. He was drinking a probiotic daily.  On 3/8/22, he said he was off Dificid - diarrhea resolved within 1-2 days of starting it. While off Dificid in early Feb, he did not have diarrhea either, but started to note a looser stool 2-2.5 weeks ago. Twelve days ago, he had 10-15 BMs in a day, so he started on the vancomycin prescription. His frequency improved - was having diarrhea 1-2x/day. He started having nausea, fatigue, and abdominal cramping last week. He continued on vancomycin 125 mg QID (started 12 days ago). Yesterday, he had 5 soft BMs QD, today had a formed BM so far (his first formed BM in awhile). He last had watery BMs on Sunday. He denied nocturnal urgency. He was taking a probiotic daily.   ADDENDUM: Stool study positive for Giardia. Negative for C diff. Tx'ed with metronidazole x 10 days and instructed to discontinue vancomycin.  On 3/30/22, he said he finished treatment for Giardia - diarrhea resolved. He now had 2-3 formed BMs QD without diarrhea. Bloating and nausea had resolved.    On 10/25/22, he noted a second bout of Giardia in June tx'ed with metronidazole.  Post stool study in August was negative.  He also notes prior to going to [place] he had a bout of diarrhea and was empirically given a 3 day course of antibiotic.  More recently he noted diarrhea if he had diary even if he took Lactaid which used to work to avoid lactose intolerance.  If he avoided diary he did not have diarrhea.  But he still had some abdominal discomfort across abdomen at umbilicus and bloating.  On 12/2/22, he said he finished abx treatment for endolimax alejandra - finished on Nov 8. He said that his symptoms resolved 2-3 days after starting treatment; however, his symptoms returned within 7 days of finishing abx. He was having abdominal discomfort, gas, and diarrhea. On some days, he had 2-3 BMs QD, but a lot of times could have 8-10 BMs QD. On the days he had 2-3 BMs, he had diarrhea while the he had semi-formed BMs on days he had 8-10 BMs. Hyoscyamine helped with abdominal pain. He could not pinpoint a dietary trigger for his symptoms. He had been taking a probiotic.  On 1/13/23, he said he had started on amitriptyline which had provided a significant benefit. He had some grogginess on the med, but taking it earlier helped. On the medication, he had 1-2 formed BMs QD and had less gas. Since running out of amitriptyline, he had more abdominal discomfort, gas, and diarrhea. He had trialed a few probiotics, but felt bloated on them and noted less of a need for them due to amitriptyline working for him. He had taken hyoscyamine a few times which had helped.  On 2/3/23, he said he had a lot of diarrhea recently (3 BMs/day were diarrhea while others were formed), so he increased amitriptyline to 10 mg 2 pills QHS. He had noticed an improvement over the last 2 days - yesterday was the first day he did not have diarrhea. He had not had any somnolence on the increased dosage of amitriptyline. He previously took Imodium. Hyoscyamine worked for abdominal pain.  On 5/23/23, he said had frequent loose BMs last week while he was out of town - 10 BMs/day. His stools were very loose at that time, but not watery. He was on Imodium 1 pill/day during that time which made some difference. He also had a lot of gas that he attributed to certain foods. He would have these bouts of frequent BMs 1 day/week and could not pinpoint a pattern. His normal bowel habit was 3-4 soft BMs/day. He only has some bleeding with frequent diarrhea. Constipation was rare. He continued on amitriptyline 10 mg 2 pills QHS. Abdominal pain was usually associated with bloating and gas. He took hyoscyamine 1-2x/week. He no longer avoided dairy, because it did not provide an improvement in his symptoms. He avoided raw vegetables. He was not taking ondansetron right now.   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 He had diarrhea for the first days of taking tinidazole x 3 days. For the last 5 days of taking paromomycin (7 day course), he felt much better. He now had regular formed BMs and his bloating had significantly improved.  On 8/15/23, he said he had recurrent symptoms off abx. He took Xifaxin which helped, but his symptoms did not resolve. While on Xifaxin, he still had a lot of cramping. About a week ago, he had watery diarrhea that he took Imodium 2 pills for. The Imodium helped with the cramping and diarrhea, but his symptoms started returning 1.5 days after Imodium use. Before having that episode of diarrhea, he had 5-6 formed BMs/day with cramping. No diarrhea since then, he now had softer stools. Previously, he would have watery diarrhea 1-2x/week. He had occasional gas. He continued on amitriptyline 10 mg 2 pills QHS with some grogginess the next day. Hyoscyamine with Gas-x helped in the past for cramping.      On 9/27/23, he said he was using 2-3 pills in a day - 1 pill then 2 pills a couple hours later if 1 does not work. He increased amitriptyline to 10 mg 3 pills QD, but was unsure if he noticed a difference. He felt more groggy on this dosage. No dry mouth on amitriptyline. He had not tried 2.5 pills instead of 3. He was having soft stools, no watery. He had abdominal discomfort 1x/week that was usually associated with softer stools or gas. He managed his abdominal discomfort with Imodium or Gas-x.   On 12/19/23, he said he alternated amitriptyline b/w 2 pills and 3 pills in a day. No significant somnolence. 1-2x/week he would take Imodium for a flare-up. He usually took 2 pills in a day, but may take another 2 later on in the day which had only happened 2x since his last visit.  Not using hyoscyamine, not needing ondansetron.   HPI: Today, he says he decreased amitriptyline to 20 mg QHS and was doing well. 5 days ago, he started having frequent watery stools. He attributes it to drinking tap water (his area is under the water advisory). The following day, he had 10 watery BMs/day then took Imodium that night. The next day he had 1-2 BMs. The following day, he had 6-7 BMs, took Imodium that evening. The next day he had 1-2 BMs, took Imodium last night. This morning he had 2 BMs and they were formed.  Labs 8/3/23 - Stool study, stool O&P x3 all negative. 5/24/23 - Stool O&P positive for Endolimax alejandra. Stool study negative. 5/23/23 - CBC normal except WBC 3.5. Celiac negative. CMP, TSH/FT4 all normal. 1/25/23 - Stool study negative. 12/8/22 - Stool study negative. 11/22/22 - Stool O&P negative. 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 Stool study negative. 3/8/22 - Stool study positive for Giardia. 3/11/20 - Stool study negative, Stool O&P negative x3. 2/19/20 - CBC, CMP all normal. 12/17/19 - CMP normal except sodium 147. GC/chlamydia negative.

## 2024-06-06 ENCOUNTER — LAB OUTSIDE AN ENCOUNTER (OUTPATIENT)
Dept: URBAN - METROPOLITAN AREA CLINIC 105 | Facility: CLINIC | Age: 34
End: 2024-06-06

## 2024-06-08 LAB
ADENOVIRUS F 40/41: NOT DETECTED
CAMPYLOBACTER: NOT DETECTED
CLOSTRIDIUM DIFFICILE: NOT DETECTED
ENTAMOEBA HISTOLYTICA: NOT DETECTED
ENTEROAGGREGATIVE E.COLI: NOT DETECTED
ENTEROTOXIGENIC E.COLI: NOT DETECTED
ESCHERICHIA COLI O157: NOT DETECTED
GIARDIA LAMBLIA: DETECTED
NOROVIRUS GI/GII: NOT DETECTED
ROTAVIRUS A: NOT DETECTED
SALMONELLA SPP.: NOT DETECTED
SHIGA-LIKE TOXIN PRODUCING E.COLI: NOT DETECTED
SHIGELLA SPP. / ENTEROINVASIVE E.COLI: NOT DETECTED
VIBRIO PARAHAEMOLYTICUS: NOT DETECTED
VIBRIO SPP.: NOT DETECTED
YERSINIA ENTEROCOLITICA: NOT DETECTED

## 2024-06-09 ENCOUNTER — TELEPHONE ENCOUNTER (OUTPATIENT)
Dept: URBAN - METROPOLITAN AREA CLINIC 105 | Facility: CLINIC | Age: 34
End: 2024-06-09

## 2024-06-09 RX ORDER — TINIDAZOLE 500 MG/1
4 TABLETS WITH FOOD TABLET ORAL ONCE A DAY
Qty: 4 TABLET | Refills: 0 | OUTPATIENT
Start: 2024-06-09 | End: 2024-06-10

## 2024-06-19 ENCOUNTER — OFFICE VISIT (OUTPATIENT)
Dept: URBAN - METROPOLITAN AREA CLINIC 105 | Facility: CLINIC | Age: 34
End: 2024-06-19

## 2024-09-11 ENCOUNTER — OFFICE VISIT (OUTPATIENT)
Dept: URBAN - METROPOLITAN AREA CLINIC 105 | Facility: CLINIC | Age: 34
End: 2024-09-11
Payer: COMMERCIAL

## 2024-09-11 VITALS
HEART RATE: 87 BPM | BODY MASS INDEX: 25.45 KG/M2 | WEIGHT: 192 LBS | HEIGHT: 73 IN | TEMPERATURE: 98.1 F | SYSTOLIC BLOOD PRESSURE: 142 MMHG | DIASTOLIC BLOOD PRESSURE: 83 MMHG

## 2024-09-11 DIAGNOSIS — R19.7 ACUTE DIARRHEA: ICD-10-CM

## 2024-09-11 PROCEDURE — 99214 OFFICE O/P EST MOD 30 MIN: CPT | Performed by: INTERNAL MEDICINE

## 2024-09-11 RX ORDER — ESCITALOPRAM OXALATE 10 MG/1
1 TABLET TABLET, FILM COATED ORAL ONCE A DAY
Status: ACTIVE | COMMUNITY

## 2024-09-11 RX ORDER — AZITHROMYCIN MONOHYDRATE 500 MG/1
1 TABLET TABLET, FILM COATED ORAL DAILY
Qty: 3 TABLET | Refills: 0 | OUTPATIENT
Start: 2024-09-11 | End: 2024-09-14

## 2024-09-11 RX ORDER — FINASTERIDE 5 MG/1
1 TABLET TABLET, FILM COATED ORAL ONCE A DAY
Status: ACTIVE | COMMUNITY

## 2024-09-11 RX ORDER — EMTRICITABINE AND TENOFOVIR DISOPROXIL FUMARATE 200; 300 MG/1; MG/1
1 TABLET TABLET, FILM COATED ORAL ONCE A DAY
Status: ACTIVE | COMMUNITY

## 2024-09-11 RX ORDER — AMITRIPTYLINE HYDROCHLORIDE 10 MG/1
TAKE 3 TABLETS BY MOUTH EVERY DAY AT BEDTIME TABLET, FILM COATED ORAL
Qty: 270 TABLET | Refills: 1 | Status: ACTIVE | COMMUNITY

## 2024-09-11 RX ORDER — ONDANSETRON 4 MG/1
1 TABLET ON THE TONGUE AND ALLOW TO DISSOLVE TABLET, ORALLY DISINTEGRATING ORAL
Qty: 30 | Refills: 2 | Status: ACTIVE | COMMUNITY
Start: 2022-03-08

## 2024-09-11 NOTE — HPI-TODAY'S VISIT:
The patient presents on follow-up for diarrhea.  PAST MEDICAL HISTORY: On 3/5/20, the patient reported an onset of constant watery diarrhea 3 weeks ago. He had 4-5 BMs/day with rare nocturnal urgency. He denied traveling before onset and last took abx in October for a tonsillectomy. Stool sample and labs were done with his PCP. They were negative. He was prescribed Cipro and Flagyl he stated. He took it for a week with no benefit. He was then prescribed a 2-week course of Xifaxin TID and referred to me. He had one week left. In the last 3-4 days, he had noticed an improvement in formation and frequency (2-3x/day). He stated he had a sensitive stomach his whole life. Eating something too sweet or oily would cause bloating, cramping, and diarrhea to a lesser frequency. He would note diarrhea 1x/few months. He had a colonoscopy in 2010 for diarrhea. It was negative. He stated he was lactose intolerant. He described both indigestion and abdominal pain as bloating/cramping. He took Descovy for HIV prevention. He used Humira for psoriasis. Previously, he took it every 2 weeks, but has decreased to 1x/month due to recurrent strep throat. Last use was in Jan 14. He took Viibryd for depression. It was well-controlled. He had a mild allergy to Aleve, he stated. He is a former tobacco user.   On 9/16/21, he noted he did not follow through with the colonoscopy in March of 2020 because of COVID. He saw Dr. Schumacher in follow-up who urged him to return for his colonoscopy given his intermittent diarrhea. He had up to 6-7 stools/day intermittently.  He was currently off Humira.  On 1/25/22, he presented in follow-up.  He saw Dr. Sargent on 1/12/22 noting recurrent C diff tx'ed with vancomycin initially then fidaxomycin.  He had a recurrence of watery diarrhea and has started on a second course of fidaxomycin that Dr. Sargent prescribed just prior to this appt.   He noted his diarrhea had resolved - almost immediately after starting fidaxomycin. He only noted some bloating and abdominal discomfort for the past 2 days, but no diarrhea - 3 BMs QD. He was drinking a probiotic daily.  On 3/8/22, he said he was off Dificid - diarrhea resolved within 1-2 days of starting it. While off Dificid in early Feb, he did not have diarrhea either, but started to note a looser stool 2-2.5 weeks ago. Twelve days ago, he had 10-15 BMs in a day, so he started on the vancomycin prescription. His frequency improved - was having diarrhea 1-2x/day. He started having nausea, fatigue, and abdominal cramping last week. He continued on vancomycin 125 mg QID (started 12 days ago). Yesterday, he had 5 soft BMs QD, today had a formed BM so far (his first formed BM in awhile). He last had watery BMs on Sunday. He denied nocturnal urgency. He was taking a probiotic daily.   ADDENDUM: Stool study positive for Giardia. Negative for C diff. Tx'ed with metronidazole x 10 days and instructed to discontinue vancomycin.  On 3/30/22, he said he finished treatment for Giardia - diarrhea resolved. He now had 2-3 formed BMs QD without diarrhea. Bloating and nausea had resolved.    On 10/25/22, he noted a second bout of Giardia in June tx'ed with metronidazole.  Post stool study in August was negative.  He also notes prior to going to [place] he had a bout of diarrhea and was empirically given a 3 day course of antibiotic.  More recently he noted diarrhea if he had diary even if he took Lactaid which used to work to avoid lactose intolerance.  If he avoided diary he did not have diarrhea.  But he still had some abdominal discomfort across abdomen at umbilicus and bloating.  On 12/2/22, he said he finished abx treatment for endolimax alejandra - finished on Nov 8. He said that his symptoms resolved 2-3 days after starting treatment; however, his symptoms returned within 7 days of finishing abx. He was having abdominal discomfort, gas, and diarrhea. On some days, he had 2-3 BMs QD, but a lot of times could have 8-10 BMs QD. On the days he had 2-3 BMs, he had diarrhea while the he had semi-formed BMs on days he had 8-10 BMs. Hyoscyamine helped with abdominal pain. He could not pinpoint a dietary trigger for his symptoms. He had been taking a probiotic.  On 1/13/23, he said he had started on amitriptyline which had provided a significant benefit. He had some grogginess on the med, but taking it earlier helped. On the medication, he had 1-2 formed BMs QD and had less gas. Since running out of amitriptyline, he had more abdominal discomfort, gas, and diarrhea. He had trialed a few probiotics, but felt bloated on them and noted less of a need for them due to amitriptyline working for him. He had taken hyoscyamine a few times which had helped.  On 2/3/23, he said he had a lot of diarrhea recently (3 BMs/day were diarrhea while others were formed), so he increased amitriptyline to 10 mg 2 pills QHS. He had noticed an improvement over the last 2 days - yesterday was the first day he did not have diarrhea. He had not had any somnolence on the increased dosage of amitriptyline. He previously took Imodium. Hyoscyamine worked for abdominal pain.  On 5/23/23, he said had frequent loose BMs last week while he was out of town - 10 BMs/day. His stools were very loose at that time, but not watery. He was on Imodium 1 pill/day during that time which made some difference. He also had a lot of gas that he attributed to certain foods. He would have these bouts of frequent BMs 1 day/week and could not pinpoint a pattern. His normal bowel habit was 3-4 soft BMs/day. He only has some bleeding with frequent diarrhea. Constipation was rare. He continued on amitriptyline 10 mg 2 pills QHS. Abdominal pain was usually associated with bloating and gas. He took hyoscyamine 1-2x/week. He no longer avoided dairy, because it did not provide an improvement in his symptoms. He avoided raw vegetables. He was not taking ondansetron right now.   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 He had diarrhea for the first days of taking tinidazole x 3 days. For the last 5 days of taking paromomycin (7 day course), he felt much better. He now had regular formed BMs and his bloating had significantly improved.  On 8/15/23, he said he had recurrent symptoms off abx. He took Xifaxin which helped, but his symptoms did not resolve. While on Xifaxin, he still had a lot of cramping. About a week ago, he had watery diarrhea that he took Imodium 2 pills for. The Imodium helped with the cramping and diarrhea, but his symptoms started returning 1.5 days after Imodium use. Before having that episode of diarrhea, he had 5-6 formed BMs/day with cramping. No diarrhea since then, he now had softer stools. Previously, he would have watery diarrhea 1-2x/week. He had occasional gas. He continued on amitriptyline 10 mg 2 pills QHS with some grogginess the next day. Hyoscyamine with Gas-x helped in the past for cramping.      On 9/27/23, he said he was using 2-3 pills in a day - 1 pill then 2 pills a couple hours later if 1 does not work. He increased amitriptyline to 10 mg 3 pills QD, but was unsure if he noticed a difference. He felt more groggy on this dosage. No dry mouth on amitriptyline. He had not tried 2.5 pills instead of 3. He was having soft stools, no watery. He had abdominal discomfort 1x/week that was usually associated with softer stools or gas. He managed his abdominal discomfort with Imodium or Gas-x.   On 12/19/23, he said he alternated amitriptyline b/w 2 pills and 3 pills in a day. No significant somnolence. 1-2x/week he would take Imodium for a flare-up. He usually took 2 pills in a day, but may take another 2 later on in the day which had only happened 2x since his last visit.  Not using hyoscyamine, not needing ondansetron.   On 6/5/24, he said he decreased amitriptyline to 20 mg QHS and was doing well. 5 days ago, he started having frequent watery stools. He attributed it to drinking tap water (his area is under the water advisory). The following day, he had 10 watery BMs/day then took Imodium that night. The next day he had 1-2 BMs. The following day, he had 6-7 BMs, took Imodium that evening. The next day he had 1-2 BMs, took Imodium last night. This morning he had 2 BMs and they were formed.  HPI: Today, pt presents to discuss stomach discomfort and gas. At last visit, stool studies ordered, to be done if diarrhea persisting. Pt was initially rx'd cipro x 5 days, but after stool studies positive for Giardia, pt was rx'd tinidazole. Today, pt states he has been having some "GI distress." Last week was having increased bloating and gas. Last weekend had some bouts of diarrhea and took some Imodium. Seems to be getting better over the course of the week. Still some bloating and gas, but not as bad. States BMs are still loose but not diarrhea. Had a little bit of bleeding from wiping over the weekend, but that has resolved. Has not taken any Imodium since 4 days ago.  No fever, chills, abdominal pain, N/V. Denies constipation.  He will be traveling internationally starting Saturday - to [place]. He is worried about being abroad and not having an "emergency medication" to take.  He is taking amitriptyline 10 mg at bedtime. He was trying to wean himself off. States he was off of it for about 1 week but then started back on it when symptoms worsened again. He thinks that stopping may have triggered his symptoms.  Labs 6/6/24 - Stool study positive for Giardia. 8/3/23 - Stool study, stool O&P x3 all negative. 5/24/23 - Stool O&P positive for Endolimax alejandra. Stool study negative. 5/23/23 - CBC normal except WBC 3.5. Celiac negative. CMP, TSH/FT4 all normal. 1/25/23 - Stool study negative. 12/8/22 - Stool study negative. 11/22/22 - Stool O&P negative. 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 Stool study negative. 3/8/22 - Stool study positive for Giardia. 3/11/20 - Stool study negative, Stool O&P negative x3. 2/19/20 - CBC, CMP all normal. 12/17/19 - CMP normal except sodium 147. GC/chlamydia negative.

## 2025-02-12 ENCOUNTER — WEB ENCOUNTER (OUTPATIENT)
Dept: URBAN - METROPOLITAN AREA CLINIC 105 | Facility: CLINIC | Age: 35
End: 2025-02-12

## 2025-07-25 ENCOUNTER — OFFICE VISIT (OUTPATIENT)
Dept: URBAN - METROPOLITAN AREA CLINIC 105 | Facility: CLINIC | Age: 35
End: 2025-07-25
Payer: COMMERCIAL

## 2025-07-25 DIAGNOSIS — L40.0 PLAQUE PSORIASIS: ICD-10-CM

## 2025-07-25 DIAGNOSIS — A06.0: ICD-10-CM

## 2025-07-25 DIAGNOSIS — R19.7 ACUTE DIARRHEA: ICD-10-CM

## 2025-07-25 DIAGNOSIS — R19.7 DIARRHEA, UNSPECIFIED TYPE: ICD-10-CM

## 2025-07-25 DIAGNOSIS — A04.72 C. DIFFICILE DIARRHEA: ICD-10-CM

## 2025-07-25 DIAGNOSIS — E73.9 LACTOSE INTOLERANCE: ICD-10-CM

## 2025-07-25 DIAGNOSIS — R10.9 ABDOMINAL DISCOMFORT: ICD-10-CM

## 2025-07-25 DIAGNOSIS — A07.1 GIARDIA: ICD-10-CM

## 2025-07-25 PROCEDURE — 99214 OFFICE O/P EST MOD 30 MIN: CPT | Performed by: INTERNAL MEDICINE

## 2025-07-25 RX ORDER — EMTRICITABINE AND TENOFOVIR DISOPROXIL FUMARATE 200; 300 MG/1; MG/1
1 TABLET TABLET, FILM COATED ORAL ONCE A DAY
Status: ACTIVE | COMMUNITY

## 2025-07-25 RX ORDER — VILAZODONE HYDROCHLORIDE 10 MG/1
1 TABLET WITH FOOD TABLET, FILM COATED ORAL ONCE A DAY
Status: ACTIVE | COMMUNITY

## 2025-07-25 RX ORDER — AMITRIPTYLINE HYDROCHLORIDE 10 MG/1
TAKE 3 TABLETS BY MOUTH EVERY DAY AT BEDTIME TABLET, FILM COATED ORAL
Qty: 270 TABLET | Refills: 1 | Status: ACTIVE | COMMUNITY

## 2025-07-25 RX ORDER — ONDANSETRON 4 MG/1
1 TABLET ON THE TONGUE AND ALLOW TO DISSOLVE TABLET, ORALLY DISINTEGRATING ORAL
Qty: 30 | Refills: 2 | Status: ACTIVE | COMMUNITY
Start: 2022-03-08

## 2025-07-25 NOTE — HPI-TODAY'S VISIT:
The patient presents on follow-up for diarrhea.  PAST MEDICAL HISTORY: On 3/5/20, the patient reported an onset of constant watery diarrhea 3 weeks ago. He had 4-5 BMs/day with rare nocturnal urgency. He denied traveling before onset and last took abx in October for a tonsillectomy. Stool sample and labs were done with his PCP. They were negative. He was prescribed Cipro and Flagyl he stated. He took it for a week with no benefit. He was then prescribed a 2-week course of Xifaxin TID and referred to me. He had one week left. In the last 3-4 days, he had noticed an improvement in formation and frequency (2-3x/day). He stated he had a sensitive stomach his whole life. Eating something too sweet or oily would cause bloating, cramping, and diarrhea to a lesser frequency. He would note diarrhea 1x/few months. He had a colonoscopy in 2010 for diarrhea. It was negative. He stated he was lactose intolerant. He described both indigestion and abdominal pain as bloating/cramping.  He took Descovy for HIV prevention. He used Humira for psoriasis. Previously, he took it every 2 weeks, but has decreased to 1x/month due to recurrent strep throat. Last use was in Jan 14. He took Viibryd for depression. It was well-controlled. He had a mild allergy to Aleve, he stated. He is a former tobacco user.   On 9/16/21, he noted he did not follow through with the colonoscopy in March of 2020 because of COVID. He saw Dr. Schumacher in follow-up who urged him to return for his colonoscopy given his intermittent diarrhea. He had up to 6-7 stools/day intermittently.  He was currently off Humira.  On 1/25/22, he presented in follow-up.  He saw Dr. Sargent on 1/12/22 noting recurrent C diff tx'ed with vancomycin initially then fidaxomycin.  He had a recurrence of watery diarrhea and has started on a second course of fidaxomycin that Dr. Sargent prescribed just prior to this appt.   He noted his diarrhea had resolved - almost immediately after starting fidaxomycin. He only noted some bloating and abdominal discomfort for the past 2 days, but no diarrhea - 3 BMs QD. He was drinking a probiotic daily.  On 3/8/22, he said he was off Dificid - diarrhea resolved within 1-2 days of starting it. While off Dificid in early Feb, he did not have diarrhea either, but started to note a looser stool 2-2.5 weeks ago. Twelve days ago, he had 10-15 BMs in a day, so he started on the vancomycin prescription. His frequency improved - was having diarrhea 1-2x/day. He started having nausea, fatigue, and abdominal cramping last week. He continued on vancomycin 125 mg QID (started 12 days ago). Yesterday, he had 5 soft BMs QD, today had a formed BM so far (his first formed BM in awhile). He last had watery BMs on Sunday. He denied nocturnal urgency. He was taking a probiotic daily.   ADDENDUM: Stool study positive for Giardia. Negative for C diff. Tx'ed with metronidazole x 10 days and instructed to discontinue vancomycin.  On 3/30/22, he said he finished treatment for Giardia - diarrhea resolved. He now had 2-3 formed BMs QD without diarrhea. Bloating and nausea had resolved.    On 10/25/22, he noted a second bout of Giardia in June tx'ed with metronidazole.  Post stool study in August was negative.  He also notes prior to going to [place] he had a bout of diarrhea and was empirically given a 3 day course of antibiotic.  More recently he noted diarrhea if he had diary even if he took Lactaid which used to work to avoid lactose intolerance.  If he avoided diary he did not have diarrhea.  But he still had some abdominal discomfort across abdomen at umbilicus and bloating.  On 12/2/22, he said he finished abx treatment for endolimax alejandra - finished on Nov 8. He said that his symptoms resolved 2-3 days after starting treatment; however, his symptoms returned within 7 days of finishing abx. He was having abdominal discomfort, gas, and diarrhea. On some days, he had 2-3 BMs QD, but a lot of times could have 8-10 BMs QD. On the days he had 2-3 BMs, he had diarrhea while the he had semi-formed BMs on days he had 8-10 BMs. Hyoscyamine helped with abdominal pain. He could not pinpoint a dietary trigger for his symptoms. He had been taking a probiotic.  On 1/13/23, he said he had started on amitriptyline which had provided a significant benefit. He had some grogginess on the med, but taking it earlier helped. On the medication, he had 1-2 formed BMs QD and had less gas. Since running out of amitriptyline, he had more abdominal discomfort, gas, and diarrhea. He had trialed a few probiotics, but felt bloated on them and noted less of a need for them due to amitriptyline working for him. He had taken hyoscyamine a few times which had helped.  On 2/3/23, he said he had a lot of diarrhea recently (3 BMs/day were diarrhea while others were formed), so he increased amitriptyline to 10 mg 2 pills QHS. He had noticed an improvement over the last 2 days - yesterday was the first day he did not have diarrhea. He had not had any somnolence on the increased dosage of amitriptyline. He previously took Imodium. Hyoscyamine worked for abdominal pain.  On 5/23/23, he said had frequent loose BMs last week while he was out of town - 10 BMs/day. His stools were very loose at that time, but not watery. He was on Imodium 1 pill/day during that time which made some difference. He also had a lot of gas that he attributed to certain foods. He would have these bouts of frequent BMs 1 day/week and could not pinpoint a pattern. His normal bowel habit was 3-4 soft BMs/day. He only has some bleeding with frequent diarrhea. Constipation was rare. He continued on amitriptyline 10 mg 2 pills QHS. Abdominal pain was usually associated with bloating and gas. He took hyoscyamine 1-2x/week. He no longer avoided dairy, because it did not provide an improvement in his symptoms. He avoided raw vegetables. He was not taking ondansetron right now.   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 He had diarrhea for the first days of taking tinidazole x 3 days. For the last 5 days of taking paromomycin (7 day course), he felt much better. He now had regular formed BMs and his bloating had significantly improved.  On 8/15/23, he said he had recurrent symptoms off abx. He took Xifaxin which helped, but his symptoms did not resolve. While on Xifaxin, he still had a lot of cramping. About a week ago, he had watery diarrhea that he took Imodium 2 pills for. The Imodium helped with the cramping and diarrhea, but his symptoms started returning 1.5 days after Imodium use. Before having that episode of diarrhea, he had 5-6 formed BMs/day with cramping. No diarrhea since then, he now had softer stools. Previously, he would have watery diarrhea 1-2x/week. He had occasional gas. He continued on amitriptyline 10 mg 2 pills QHS with some grogginess the next day. Hyoscyamine with Gas-x helped in the past for cramping.      On 9/27/23, he said he was using 2-3 pills in a day - 1 pill then 2 pills a couple hours later if 1 does not work. He increased amitriptyline to 10 mg 3 pills QD, but was unsure if he noticed a difference. He felt more groggy on this dosage. No dry mouth on amitriptyline. He had not tried 2.5 pills instead of 3. He was having soft stools, no watery. He had abdominal discomfort 1x/week that was usually associated with softer stools or gas. He managed his abdominal discomfort with Imodium or Gas-x.   On 12/19/23, he said he alternated amitriptyline b/w 2 pills and 3 pills in a day. No significant somnolence. 1-2x/week he would take Imodium for a flare-up. He usually took 2 pills in a day, but may take another 2 later on in the day which had only happened 2x since his last visit.  Not using hyoscyamine, not needing ondansetron.   On 6/5/24, he said he decreased amitriptyline to 20 mg QHS and was doing well. 5 days ago, he started having frequent watery stools. He attributed it to drinking tap water (his area was under the water advisory). The following day, he had 10 watery BMs/day then took Imodium that night. The next day he had 1-2 BMs. The following day, he had 6-7 BMs, took Imodium that evening. The next day he had 1-2 BMs, took Imodium last night. This morning he had 2 BMs and they were formed.  HPI Today, he says a couple weeks ago he was on abx. For the past 1.5 weeks, he's been having intermittent diarrhea. He has diarrhea 3 days/week, 3-4x in a day . In b/w bouts, he's having a formed but soft BM. Imodium PRN helps. He is on amitriptyline 10 mg 1 pill QD. He continues to use Lactaid for dairy. He has not used hyoscyamine in at least 6 months. He is using [employer].  Labs 8/3/23 - Stool study, stool O&P x3 all negative. 5/24/23 - Stool O&P positive for Endolimax alejandra. Stool study negative. 5/23/23 - CBC normal except WBC 3.5. Celiac negative. CMP, TSH/FT4 all normal. 1/25/23 - Stool study negative. 12/8/22 - Stool study negative. 11/22/22 - Stool O&P negative. 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 Stool study negative. 3/8/22 - Stool study positive for Giardia. 3/11/20 - Stool study negative, Stool O&P negative x3. 2/19/20 - CBC, CMP all normal. 12/17/19 - CMP normal except sodium 147. GC/chlamydia negative.

## 2025-07-26 ENCOUNTER — LAB OUTSIDE AN ENCOUNTER (OUTPATIENT)
Dept: URBAN - METROPOLITAN AREA CLINIC 105 | Facility: CLINIC | Age: 35
End: 2025-07-26

## 2025-08-26 ENCOUNTER — WEB ENCOUNTER (OUTPATIENT)
Dept: URBAN - METROPOLITAN AREA CLINIC 124 | Facility: CLINIC | Age: 35
End: 2025-08-26

## 2025-08-26 RX ORDER — AMITRIPTYLINE HYDROCHLORIDE 10 MG/1
TAKE 3 TABLETS BY MOUTH EVERY DAY AT BEDTIME TABLET, FILM COATED ORAL
Qty: 270 | Refills: 3 | OUTPATIENT